# Patient Record
Sex: MALE | Race: WHITE | NOT HISPANIC OR LATINO | Employment: UNEMPLOYED | URBAN - METROPOLITAN AREA
[De-identification: names, ages, dates, MRNs, and addresses within clinical notes are randomized per-mention and may not be internally consistent; named-entity substitution may affect disease eponyms.]

---

## 2023-11-01 PROBLEM — E78.00 HYPERCHOLESTEROLEMIA: Status: ACTIVE | Noted: 2023-11-01

## 2023-11-01 PROBLEM — F32.A DEPRESSION: Status: ACTIVE | Noted: 2023-11-01

## 2023-11-01 PROBLEM — G89.29 CHRONIC PAIN: Status: ACTIVE | Noted: 2023-11-01

## 2023-11-01 PROBLEM — N52.9 ERECTILE DYSFUNCTION: Status: ACTIVE | Noted: 2023-11-01

## 2023-11-01 PROBLEM — R20.0 NUMBNESS: Status: ACTIVE | Noted: 2023-11-01

## 2023-11-01 PROBLEM — G56.00 CARPAL TUNNEL SYNDROME: Status: ACTIVE | Noted: 2023-11-01

## 2023-11-01 PROBLEM — E11.9 TYPE 2 DIABETES MELLITUS WITHOUT COMPLICATION (MULTI): Status: ACTIVE | Noted: 2023-11-01

## 2023-11-01 PROBLEM — E11.65 HYPERGLYCEMIA DUE TO TYPE 2 DIABETES MELLITUS (MULTI): Status: ACTIVE | Noted: 2023-11-01

## 2023-11-01 PROBLEM — J44.9 CHRONIC OBSTRUCTIVE PULMONARY DISEASE (MULTI): Status: ACTIVE | Noted: 2023-11-01

## 2023-11-01 PROBLEM — F41.9 ANXIETY: Status: ACTIVE | Noted: 2023-11-01

## 2023-11-01 PROBLEM — M50.90 CERVICAL DISC DISEASE: Status: ACTIVE | Noted: 2023-11-01

## 2023-11-01 PROBLEM — C44.91 BASAL CELL CARCINOMA: Status: ACTIVE | Noted: 2023-11-01

## 2023-11-01 RX ORDER — TIRZEPATIDE 5 MG/.5ML
5 INJECTION, SOLUTION SUBCUTANEOUS
COMMUNITY
Start: 2023-07-24 | End: 2023-11-02 | Stop reason: DRUGHIGH

## 2023-11-01 RX ORDER — TADALAFIL 20 MG/1
20 TABLET ORAL DAILY
COMMUNITY
Start: 2020-10-01 | End: 2023-11-02 | Stop reason: SDUPTHER

## 2023-11-01 RX ORDER — TIRZEPATIDE 2.5 MG/.5ML
2.5 INJECTION, SOLUTION SUBCUTANEOUS
COMMUNITY
Start: 2023-07-24 | End: 2023-11-02 | Stop reason: DRUGHIGH

## 2023-11-01 RX ORDER — OXYCODONE AND ACETAMINOPHEN 5; 325 MG/1; MG/1
1 TABLET ORAL EVERY 6 HOURS PRN
COMMUNITY
Start: 2023-05-11 | End: 2023-11-02 | Stop reason: SDUPTHER

## 2023-11-01 RX ORDER — ALPRAZOLAM 0.5 MG/1
0.5 TABLET ORAL 2 TIMES DAILY PRN
COMMUNITY
Start: 2023-05-11 | End: 2023-11-02 | Stop reason: SDUPTHER

## 2023-11-01 RX ORDER — PEN NEEDLE, DIABETIC 30 GX3/16"
NEEDLE, DISPOSABLE MISCELLANEOUS
COMMUNITY
Start: 2020-12-07

## 2023-11-01 RX ORDER — SERTRALINE HYDROCHLORIDE 50 MG/1
50 TABLET, FILM COATED ORAL DAILY
COMMUNITY
Start: 2023-05-11 | End: 2023-11-02 | Stop reason: DRUGHIGH

## 2023-11-01 RX ORDER — FLUTICASONE FUROATE AND VILANTEROL 200; 25 UG/1; UG/1
1 POWDER RESPIRATORY (INHALATION) DAILY
COMMUNITY
Start: 2021-03-05

## 2023-11-01 RX ORDER — DAPAGLIFLOZIN 10 MG/1
10 TABLET, FILM COATED ORAL DAILY
COMMUNITY
Start: 2023-01-24

## 2023-11-01 RX ORDER — OMEPRAZOLE 40 MG/1
40 CAPSULE, DELAYED RELEASE ORAL
COMMUNITY
Start: 2023-03-21

## 2023-11-01 RX ORDER — DULAGLUTIDE 1.5 MG/.5ML
1.5 INJECTION, SOLUTION SUBCUTANEOUS
COMMUNITY
Start: 2022-09-13

## 2023-11-02 ENCOUNTER — OFFICE VISIT (OUTPATIENT)
Dept: PRIMARY CARE | Facility: CLINIC | Age: 53
End: 2023-11-02
Payer: COMMERCIAL

## 2023-11-02 VITALS
WEIGHT: 166 LBS | HEIGHT: 70 IN | SYSTOLIC BLOOD PRESSURE: 122 MMHG | DIASTOLIC BLOOD PRESSURE: 74 MMHG | TEMPERATURE: 98.4 F | HEART RATE: 74 BPM | RESPIRATION RATE: 18 BRPM | BODY MASS INDEX: 23.77 KG/M2 | OXYGEN SATURATION: 97 %

## 2023-11-02 DIAGNOSIS — E11.9 TYPE 2 DIABETES MELLITUS WITHOUT COMPLICATION, UNSPECIFIED WHETHER LONG TERM INSULIN USE (MULTI): ICD-10-CM

## 2023-11-02 DIAGNOSIS — F41.9 ANXIETY: Primary | ICD-10-CM

## 2023-11-02 DIAGNOSIS — N52.9 ERECTILE DYSFUNCTION, UNSPECIFIED ERECTILE DYSFUNCTION TYPE: ICD-10-CM

## 2023-11-02 DIAGNOSIS — J44.9 CHRONIC OBSTRUCTIVE PULMONARY DISEASE, UNSPECIFIED COPD TYPE (MULTI): ICD-10-CM

## 2023-11-02 DIAGNOSIS — M50.90 CERVICAL DISC DISEASE: ICD-10-CM

## 2023-11-02 DIAGNOSIS — L98.9 SKIN LESION: ICD-10-CM

## 2023-11-02 LAB — POC HEMOGLOBIN A1C: 10.4 % (ref 4.2–6.5)

## 2023-11-02 PROCEDURE — 3078F DIAST BP <80 MM HG: CPT | Performed by: FAMILY MEDICINE

## 2023-11-02 PROCEDURE — 99214 OFFICE O/P EST MOD 30 MIN: CPT | Performed by: FAMILY MEDICINE

## 2023-11-02 PROCEDURE — 3074F SYST BP LT 130 MM HG: CPT | Performed by: FAMILY MEDICINE

## 2023-11-02 RX ORDER — OXYCODONE AND ACETAMINOPHEN 5; 325 MG/1; MG/1
1 TABLET ORAL EVERY 6 HOURS PRN
Qty: 30 TABLET | Refills: 0 | Status: SHIPPED | OUTPATIENT
Start: 2023-11-02 | End: 2024-01-09 | Stop reason: SDUPTHER

## 2023-11-02 RX ORDER — ALPRAZOLAM 0.5 MG/1
0.5 TABLET ORAL 2 TIMES DAILY PRN
Qty: 60 TABLET | Refills: 0 | Status: SHIPPED | OUTPATIENT
Start: 2023-11-02 | End: 2024-01-09 | Stop reason: ALTCHOICE

## 2023-11-02 RX ORDER — ALBUTEROL SULFATE 90 UG/1
2 AEROSOL, METERED RESPIRATORY (INHALATION) EVERY 4 HOURS PRN
Qty: 8 G | Refills: 5 | Status: SHIPPED | OUTPATIENT
Start: 2023-11-02 | End: 2024-01-18 | Stop reason: SDUPTHER

## 2023-11-02 RX ORDER — TADALAFIL 20 MG/1
20 TABLET ORAL DAILY
Qty: 10 TABLET | Refills: 11 | Status: SHIPPED | OUTPATIENT
Start: 2023-11-02

## 2023-11-02 RX ORDER — TIRZEPATIDE 7.5 MG/.5ML
7.5 INJECTION, SOLUTION SUBCUTANEOUS
Qty: 2 ML | Refills: 3 | Status: SHIPPED | OUTPATIENT
Start: 2023-11-02

## 2023-11-02 ASSESSMENT — PATIENT HEALTH QUESTIONNAIRE - PHQ9
2. FEELING DOWN, DEPRESSED OR HOPELESS: NOT AT ALL
SUM OF ALL RESPONSES TO PHQ9 QUESTIONS 1 AND 2: 0
1. LITTLE INTEREST OR PLEASURE IN DOING THINGS: NOT AT ALL

## 2023-11-02 ASSESSMENT — PAIN SCALES - GENERAL: PAINLEVEL: 4

## 2023-11-02 NOTE — PROGRESS NOTES
"Subjective   Patient ID: Clifton Beverly Jr. is a 53 y.o. male who presents for Med Refill (Pt here for medication check).    Med Refill         Review of Systems    Objective   /74 (BP Location: Left arm, Patient Position: Sitting, BP Cuff Size: Adult)   Pulse 74   Temp 36.9 °C (98.4 °F) (Temporal)   Resp 18   Ht 1.778 m (5' 10\")   Wt 75.3 kg (166 lb)   SpO2 97%   BMI 23.82 kg/m²     Physical Exam  Constitutional:       General: He is not in acute distress.     Appearance: Normal appearance.   Cardiovascular:      Rate and Rhythm: Normal rate and regular rhythm.      Heart sounds: No murmur heard.  Pulmonary:      Breath sounds: Normal breath sounds. No wheezing.   Neurological:      Mental Status: He is alert.         Assessment/Plan   Problem List Items Addressed This Visit             ICD-10-CM    Anxiety - Primary F41.9    Cervical disc disease M50.90    Chronic obstructive pulmonary disease (CMS/[facility]) J44.9    Erectile dysfunction N52.9    Type 2 diabetes mellitus without complication (CMS/[facility]) E11.9    Relevant Orders    POCT glycosylated hemoglobin (Hb A1C) manually resulted (Completed)          "

## 2024-01-09 ENCOUNTER — OFFICE VISIT (OUTPATIENT)
Dept: PRIMARY CARE | Facility: CLINIC | Age: 54
End: 2024-01-09
Payer: COMMERCIAL

## 2024-01-09 VITALS
DIASTOLIC BLOOD PRESSURE: 76 MMHG | HEART RATE: 77 BPM | OXYGEN SATURATION: 97 % | BODY MASS INDEX: 24.14 KG/M2 | TEMPERATURE: 98.6 F | RESPIRATION RATE: 18 BRPM | SYSTOLIC BLOOD PRESSURE: 128 MMHG | HEIGHT: 69 IN | WEIGHT: 163 LBS

## 2024-01-09 DIAGNOSIS — J44.9 CHRONIC OBSTRUCTIVE PULMONARY DISEASE, UNSPECIFIED COPD TYPE (MULTI): ICD-10-CM

## 2024-01-09 DIAGNOSIS — M50.90 CERVICAL DISC DISEASE: ICD-10-CM

## 2024-01-09 DIAGNOSIS — F41.9 ANXIETY: Primary | ICD-10-CM

## 2024-01-09 PROCEDURE — 3078F DIAST BP <80 MM HG: CPT | Performed by: FAMILY MEDICINE

## 2024-01-09 PROCEDURE — 99213 OFFICE O/P EST LOW 20 MIN: CPT | Performed by: FAMILY MEDICINE

## 2024-01-09 PROCEDURE — 3074F SYST BP LT 130 MM HG: CPT | Performed by: FAMILY MEDICINE

## 2024-01-09 RX ORDER — SERTRALINE HYDROCHLORIDE 100 MG/1
100 TABLET, FILM COATED ORAL DAILY
Qty: 30 TABLET | Refills: 1 | Status: SHIPPED | OUTPATIENT
Start: 2024-01-09 | End: 2024-03-26

## 2024-01-09 RX ORDER — OXYCODONE AND ACETAMINOPHEN 5; 325 MG/1; MG/1
1 TABLET ORAL EVERY 6 HOURS PRN
Qty: 30 TABLET | Refills: 0 | Status: SHIPPED | OUTPATIENT
Start: 2024-01-09

## 2024-01-09 RX ORDER — FLUTICASONE FUROATE, UMECLIDINIUM BROMIDE AND VILANTEROL TRIFENATATE 200; 62.5; 25 UG/1; UG/1; UG/1
1 POWDER RESPIRATORY (INHALATION) DAILY
Qty: 1 EACH | Refills: 3 | Status: SHIPPED | OUTPATIENT
Start: 2024-01-09 | End: 2024-02-08

## 2024-01-09 RX ORDER — SERTRALINE HYDROCHLORIDE 50 MG/1
50 TABLET, FILM COATED ORAL DAILY
COMMUNITY
Start: 2023-12-10

## 2024-01-09 RX ORDER — CLONAZEPAM 0.5 MG/1
0.5 TABLET ORAL NIGHTLY
Qty: 30 TABLET | Refills: 0 | Status: SHIPPED | OUTPATIENT
Start: 2024-01-09 | End: 2024-01-18 | Stop reason: SDUPTHER

## 2024-01-09 RX ORDER — BUDESONIDE AND FORMOTEROL FUMARATE DIHYDRATE 160; 4.5 UG/1; UG/1
2 AEROSOL RESPIRATORY (INHALATION)
COMMUNITY
End: 2024-01-29 | Stop reason: WASHOUT

## 2024-01-09 ASSESSMENT — PATIENT HEALTH QUESTIONNAIRE - PHQ9
SUM OF ALL RESPONSES TO PHQ9 QUESTIONS 1 AND 2: 0
2. FEELING DOWN, DEPRESSED OR HOPELESS: NOT AT ALL
1. LITTLE INTEREST OR PLEASURE IN DOING THINGS: NOT AT ALL

## 2024-01-09 ASSESSMENT — PAIN SCALES - GENERAL: PAINLEVEL: 4

## 2024-01-09 NOTE — PROGRESS NOTES
"Subjective   Patient ID: Clifton Beverly Jr. is a 53 y.o. male who presents for MEDICATION REFILLS (PT HERE FOR MEDICATION REFILLS).    HPI     Review of Systems    Objective   /76 (BP Location: Left arm, Patient Position: Sitting, BP Cuff Size: Adult)   Pulse 77   Temp 37 °C (98.6 °F) (Temporal)   Resp 18   Ht 1.753 m (5' 9\")   Wt 73.9 kg (163 lb)   SpO2 97%   BMI 24.07 kg/m²     Physical Exam  Constitutional:       General: He is not in acute distress.     Appearance: Normal appearance.   Cardiovascular:      Rate and Rhythm: Normal rate and regular rhythm.      Heart sounds: No murmur heard.  Pulmonary:      Breath sounds: Normal breath sounds. No wheezing.   Neurological:      Mental Status: He is alert.         Assessment/Plan   Problem List Items Addressed This Visit             ICD-10-CM    Anxiety - Primary F41.9    Cervical disc disease M50.90    Chronic obstructive pulmonary disease (CMS/HCC) J44.9          "

## 2024-01-12 DIAGNOSIS — F41.9 ANXIETY: ICD-10-CM

## 2024-01-12 DIAGNOSIS — M50.90 CERVICAL DISC DISEASE: ICD-10-CM

## 2024-01-12 DIAGNOSIS — J44.9 CHRONIC OBSTRUCTIVE PULMONARY DISEASE, UNSPECIFIED COPD TYPE (MULTI): ICD-10-CM

## 2024-01-12 RX ORDER — FLUTICASONE FUROATE, UMECLIDINIUM BROMIDE AND VILANTEROL TRIFENATATE 200; 62.5; 25 UG/1; UG/1; UG/1
1 POWDER RESPIRATORY (INHALATION) DAILY
Qty: 1 EACH | Refills: 3 | OUTPATIENT
Start: 2024-01-12 | End: 2024-02-11

## 2024-01-12 RX ORDER — OXYCODONE AND ACETAMINOPHEN 5; 325 MG/1; MG/1
1 TABLET ORAL EVERY 6 HOURS PRN
Qty: 30 TABLET | Refills: 0 | OUTPATIENT
Start: 2024-01-12

## 2024-01-12 RX ORDER — SERTRALINE HYDROCHLORIDE 100 MG/1
100 TABLET, FILM COATED ORAL DAILY
Qty: 30 TABLET | Refills: 1 | OUTPATIENT
Start: 2024-01-12 | End: 2024-03-12

## 2024-01-12 RX ORDER — CLONAZEPAM 0.5 MG/1
0.5 TABLET ORAL NIGHTLY
Qty: 30 TABLET | Refills: 0 | OUTPATIENT
Start: 2024-01-12 | End: 2024-02-11

## 2024-01-15 DIAGNOSIS — F41.9 ANXIETY: ICD-10-CM

## 2024-01-15 DIAGNOSIS — M50.90 CERVICAL DISC DISEASE: ICD-10-CM

## 2024-01-15 RX ORDER — OXYCODONE AND ACETAMINOPHEN 5; 325 MG/1; MG/1
1 TABLET ORAL EVERY 6 HOURS PRN
Qty: 30 TABLET | Refills: 0 | OUTPATIENT
Start: 2024-01-15

## 2024-01-15 RX ORDER — SERTRALINE HYDROCHLORIDE 50 MG/1
50 TABLET, FILM COATED ORAL DAILY
Qty: 30 TABLET | Refills: 11 | OUTPATIENT
Start: 2024-01-15 | End: 2025-01-14

## 2024-01-15 RX ORDER — CLONAZEPAM 0.5 MG/1
0.5 TABLET ORAL NIGHTLY
Qty: 30 TABLET | Refills: 0 | OUTPATIENT
Start: 2024-01-15 | End: 2024-02-14

## 2024-01-18 DIAGNOSIS — F41.9 ANXIETY: ICD-10-CM

## 2024-01-18 DIAGNOSIS — J44.9 CHRONIC OBSTRUCTIVE PULMONARY DISEASE, UNSPECIFIED COPD TYPE (MULTI): ICD-10-CM

## 2024-01-18 RX ORDER — ALBUTEROL SULFATE 90 UG/1
2 AEROSOL, METERED RESPIRATORY (INHALATION) EVERY 4 HOURS PRN
Qty: 18 G | Refills: 5 | Status: SHIPPED | OUTPATIENT
Start: 2024-01-18 | End: 2025-01-17

## 2024-01-18 RX ORDER — CLONAZEPAM 0.5 MG/1
0.5 TABLET ORAL NIGHTLY
Qty: 30 TABLET | Refills: 0 | Status: SHIPPED | OUTPATIENT
Start: 2024-01-18 | End: 2024-02-17

## 2024-01-22 DIAGNOSIS — M50.90 CERVICAL DISC DISEASE: ICD-10-CM

## 2024-01-22 RX ORDER — OXYCODONE AND ACETAMINOPHEN 5; 325 MG/1; MG/1
1 TABLET ORAL EVERY 6 HOURS PRN
Qty: 30 TABLET | Refills: 0 | OUTPATIENT
Start: 2024-01-22

## 2024-01-29 ENCOUNTER — OFFICE VISIT (OUTPATIENT)
Dept: PRIMARY CARE | Facility: CLINIC | Age: 54
End: 2024-01-29
Payer: COMMERCIAL

## 2024-01-29 VITALS
OXYGEN SATURATION: 96 % | WEIGHT: 163.8 LBS | SYSTOLIC BLOOD PRESSURE: 128 MMHG | DIASTOLIC BLOOD PRESSURE: 72 MMHG | HEIGHT: 70 IN | TEMPERATURE: 96.8 F | RESPIRATION RATE: 18 BRPM | BODY MASS INDEX: 23.45 KG/M2 | HEART RATE: 85 BPM

## 2024-01-29 DIAGNOSIS — F41.9 ANXIETY: ICD-10-CM

## 2024-01-29 DIAGNOSIS — M54.12 CERVICAL RADICULITIS: Primary | ICD-10-CM

## 2024-01-29 PROBLEM — C44.90 SKIN CANCER: Status: ACTIVE | Noted: 2018-01-24

## 2024-01-29 PROBLEM — H57.10 BLIND PAINFUL EYE: Status: ACTIVE | Noted: 2018-05-22

## 2024-01-29 PROBLEM — H54.40 BLIND PAINFUL EYE: Status: ACTIVE | Noted: 2018-05-22

## 2024-01-29 PROBLEM — M54.2 CERVICALGIA: Status: ACTIVE | Noted: 2020-10-14

## 2024-01-29 PROCEDURE — 3078F DIAST BP <80 MM HG: CPT | Performed by: FAMILY MEDICINE

## 2024-01-29 PROCEDURE — 99213 OFFICE O/P EST LOW 20 MIN: CPT | Performed by: FAMILY MEDICINE

## 2024-01-29 PROCEDURE — 3074F SYST BP LT 130 MM HG: CPT | Performed by: FAMILY MEDICINE

## 2024-01-29 RX ORDER — ALPRAZOLAM 0.5 MG/1
0.5 TABLET ORAL NIGHTLY PRN
Qty: 7 TABLET | Refills: 0 | Status: SHIPPED | OUTPATIENT
Start: 2024-01-29 | End: 2024-02-05

## 2024-01-29 RX ORDER — CYCLOBENZAPRINE HCL 10 MG
10 TABLET ORAL NIGHTLY PRN
Qty: 30 TABLET | Refills: 0 | Status: SHIPPED | OUTPATIENT
Start: 2024-01-29 | End: 2024-03-29

## 2024-01-29 RX ORDER — PREDNISONE 20 MG/1
TABLET ORAL
Qty: 20 TABLET | Refills: 0 | Status: SHIPPED | OUTPATIENT
Start: 2024-01-29 | End: 2024-02-10

## 2024-01-29 RX ORDER — OXYCODONE AND ACETAMINOPHEN 5; 325 MG/1; MG/1
1 TABLET ORAL EVERY 6 HOURS PRN
Qty: 5 TABLET | Refills: 0 | Status: SHIPPED | OUTPATIENT
Start: 2024-01-29 | End: 2024-02-05

## 2024-01-29 ASSESSMENT — PAIN SCALES - GENERAL: PAINLEVEL: 7

## 2024-01-29 ASSESSMENT — ENCOUNTER SYMPTOMS: NECK PAIN: 1

## 2024-01-29 NOTE — PROGRESS NOTES
"Subjective   Patient ID: Clifton Beverly Jr. is a 53 y.o. male who presents for Neck Pain.    HPI pt c/o ongoing neck pain    Review of Systems    Objective   /72   Pulse 85   Temp 36 °C (96.8 °F)   Resp 18   Ht 1.778 m (5' 10\")   Wt 74.3 kg (163 lb 12.8 oz)   SpO2 96%   BMI 23.50 kg/m²     Physical Exam    Assessment/Plan          "

## 2024-01-29 NOTE — PROGRESS NOTES
"Subjective   Patient ID: Clifton Beverly Jr. is a 53 y.o. male who presents for Neck Pain.    Neck Pain          Review of Systems   Musculoskeletal:  Positive for neck pain.       Objective   /72   Pulse 85   Temp 36 °C (96.8 °F)   Resp 18   Ht 1.778 m (5' 10\")   Wt 74.3 kg (163 lb 12.8 oz)   SpO2 96%   BMI 23.50 kg/m²     Physical Exam  Constitutional:       General: He is not in acute distress.     Appearance: Normal appearance.   Cardiovascular:      Rate and Rhythm: Normal rate and regular rhythm.      Heart sounds: No murmur heard.  Pulmonary:      Breath sounds: Normal breath sounds. No wheezing.   Neurological:      Mental Status: He is alert.     Neck: psm spasm and ttp bl    Assessment/Plan   Problem List Items Addressed This Visit             ICD-10-CM    Anxiety F41.9    Relevant Medications    ALPRAZolam (Xanax) 0.5 mg tablet    Cervical radiculitis - Primary M54.12    Relevant Medications    predniSONE (Deltasone) 20 mg tablet    cyclobenzaprine (Flexeril) 10 mg tablet    oxyCODONE-acetaminophen (Percocet) 5-325 mg tablet     Pt will lower xzoloft to 75 mg qd     "

## 2024-03-25 DIAGNOSIS — F41.9 ANXIETY: ICD-10-CM

## 2024-03-26 RX ORDER — SERTRALINE HYDROCHLORIDE 100 MG/1
100 TABLET, FILM COATED ORAL DAILY
Qty: 90 TABLET | Refills: 3 | Status: SHIPPED | OUTPATIENT
Start: 2024-03-26

## 2024-08-28 ENCOUNTER — APPOINTMENT (OUTPATIENT)
Dept: RADIOLOGY | Facility: HOSPITAL | Age: 54
End: 2024-08-28
Payer: COMMERCIAL

## 2024-08-28 ENCOUNTER — HOSPITAL ENCOUNTER (EMERGENCY)
Facility: HOSPITAL | Age: 54
Discharge: LEFT BEFORE DISCHARGE | End: 2024-08-28
Attending: EMERGENCY MEDICINE
Payer: COMMERCIAL

## 2024-08-28 VITALS
HEART RATE: 72 BPM | TEMPERATURE: 97.7 F | SYSTOLIC BLOOD PRESSURE: 136 MMHG | HEIGHT: 69 IN | OXYGEN SATURATION: 96 % | RESPIRATION RATE: 22 BRPM | WEIGHT: 150 LBS | DIASTOLIC BLOOD PRESSURE: 89 MMHG | BODY MASS INDEX: 22.22 KG/M2

## 2024-08-28 DIAGNOSIS — R11.2 NAUSEA AND VOMITING, UNSPECIFIED VOMITING TYPE: ICD-10-CM

## 2024-08-28 DIAGNOSIS — R10.13 EPIGASTRIC PAIN: Primary | ICD-10-CM

## 2024-08-28 LAB
ALBUMIN SERPL-MCNC: 3.9 G/DL (ref 3.5–5)
ALP BLD-CCNC: 79 U/L (ref 35–125)
ALT SERPL-CCNC: 14 U/L (ref 5–40)
AMPHETAMINES UR QL SCN>1000 NG/ML: NEGATIVE
ANION GAP BLDV CALCULATED.4IONS-SCNC: 11 MMOL/L (ref 10–25)
ANION GAP SERPL CALC-SCNC: 10 MMOL/L
APAP SERPL-MCNC: <5 UG/ML
APPEARANCE UR: CLEAR
AST SERPL-CCNC: 13 U/L (ref 5–40)
B-OH-BUTYR+ACETOACET BLD-SCNC: 0.2 MMOL/L (ref 0.1–0.3)
BARBITURATES UR QL SCN>300 NG/ML: NEGATIVE
BASE EXCESS BLDV CALC-SCNC: 3.3 MMOL/L (ref -2–3)
BASOPHILS # BLD AUTO: 0.07 X10*3/UL (ref 0–0.1)
BASOPHILS NFR BLD AUTO: 1 %
BENZODIAZ UR QL SCN>300 NG/ML: NEGATIVE
BILIRUB SERPL-MCNC: 0.3 MG/DL (ref 0.1–1.2)
BILIRUB UR STRIP.AUTO-MCNC: NEGATIVE MG/DL
BODY TEMPERATURE: 37 DEGREES CELSIUS
BUN SERPL-MCNC: 7 MG/DL (ref 8–25)
BZE UR QL SCN>300 NG/ML: NEGATIVE
CA-I BLDV-SCNC: 1.14 MMOL/L (ref 1.1–1.33)
CALCIUM SERPL-MCNC: 8.7 MG/DL (ref 8.5–10.4)
CANNABINOIDS UR QL SCN>50 NG/ML: POSITIVE
CHLORIDE BLDV-SCNC: 96 MMOL/L (ref 98–107)
CHLORIDE SERPL-SCNC: 98 MMOL/L (ref 97–107)
CO2 SERPL-SCNC: 27 MMOL/L (ref 24–31)
COLOR UR: ABNORMAL
CREAT SERPL-MCNC: 0.6 MG/DL (ref 0.4–1.6)
EGFRCR SERPLBLD CKD-EPI 2021: >90 ML/MIN/1.73M*2
EOSINOPHIL # BLD AUTO: 0.11 X10*3/UL (ref 0–0.7)
EOSINOPHIL NFR BLD AUTO: 1.5 %
ERYTHROCYTE [DISTWIDTH] IN BLOOD BY AUTOMATED COUNT: 12.6 % (ref 11.5–14.5)
ETHANOL SERPL-MCNC: <0.01 G/DL
FENTANYL+NORFENTANYL UR QL SCN: NEGATIVE
GLUCOSE BLDV-MCNC: 321 MG/DL (ref 74–99)
GLUCOSE SERPL-MCNC: 299 MG/DL (ref 65–99)
GLUCOSE UR STRIP.AUTO-MCNC: ABNORMAL MG/DL
HCO3 BLDV-SCNC: 26.9 MMOL/L (ref 22–26)
HCT VFR BLD AUTO: 44.3 % (ref 41–52)
HCT VFR BLD EST: 50 % (ref 41–52)
HGB BLD-MCNC: 15.5 G/DL (ref 13.5–17.5)
HGB BLDV-MCNC: 16.5 G/DL (ref 13.5–17.5)
IMM GRANULOCYTES # BLD AUTO: 0.01 X10*3/UL (ref 0–0.7)
IMM GRANULOCYTES NFR BLD AUTO: 0.1 % (ref 0–0.9)
INHALED O2 CONCENTRATION: 21 %
KETONES UR STRIP.AUTO-MCNC: NEGATIVE MG/DL
LACTATE BLDV-SCNC: 1.7 MMOL/L (ref 0.4–2)
LEUKOCYTE ESTERASE UR QL STRIP.AUTO: NEGATIVE
LIPASE SERPL-CCNC: 29 U/L (ref 16–63)
LYMPHOCYTES # BLD AUTO: 1.74 X10*3/UL (ref 1.2–4.8)
LYMPHOCYTES NFR BLD AUTO: 24.4 %
MAGNESIUM SERPL-MCNC: 1.9 MG/DL (ref 1.6–3.1)
MCH RBC QN AUTO: 30.6 PG (ref 26–34)
MCHC RBC AUTO-ENTMCNC: 35 G/DL (ref 32–36)
MCV RBC AUTO: 87 FL (ref 80–100)
METHADONE UR QL SCN>300 NG/ML: NEGATIVE
MONOCYTES # BLD AUTO: 0.42 X10*3/UL (ref 0.1–1)
MONOCYTES NFR BLD AUTO: 5.9 %
NEUTROPHILS # BLD AUTO: 4.79 X10*3/UL (ref 1.2–7.7)
NEUTROPHILS NFR BLD AUTO: 67.1 %
NITRITE UR QL STRIP.AUTO: NEGATIVE
NRBC BLD-RTO: 0 /100 WBCS (ref 0–0)
OPIATES UR QL SCN>300 NG/ML: NEGATIVE
OXYCODONE UR QL: NEGATIVE
OXYHGB MFR BLDV: 60.4 % (ref 45–75)
PCO2 BLDV: 37 MM HG (ref 41–51)
PCP UR QL SCN>25 NG/ML: NEGATIVE
PH BLDV: 7.47 PH (ref 7.33–7.43)
PH UR STRIP.AUTO: 7.5 [PH]
PHOSPHATE SERPL-MCNC: 3 MG/DL (ref 2.5–4.5)
PLATELET # BLD AUTO: 351 X10*3/UL (ref 150–450)
PO2 BLDV: 33 MM HG (ref 35–45)
POTASSIUM BLDV-SCNC: 3.9 MMOL/L (ref 3.5–5.3)
POTASSIUM SERPL-SCNC: 4 MMOL/L (ref 3.4–5.1)
PROT SERPL-MCNC: 6.2 G/DL (ref 5.9–7.9)
PROT UR STRIP.AUTO-MCNC: NEGATIVE MG/DL
RBC # BLD AUTO: 5.07 X10*6/UL (ref 4.5–5.9)
RBC # UR STRIP.AUTO: NEGATIVE /UL
SALICYLATES SERPL-MCNC: 2 MG/DL
SAO2 % BLDV: 67 % (ref 45–75)
SODIUM BLDV-SCNC: 130 MMOL/L (ref 136–145)
SODIUM SERPL-SCNC: 135 MMOL/L (ref 133–145)
SP GR UR STRIP.AUTO: 1.02
TROPONIN T SERPL-MCNC: 9 NG/L
UROBILINOGEN UR STRIP.AUTO-MCNC: NORMAL MG/DL
WBC # BLD AUTO: 7.1 X10*3/UL (ref 4.4–11.3)

## 2024-08-28 PROCEDURE — 74177 CT ABD & PELVIS W/CONTRAST: CPT

## 2024-08-28 PROCEDURE — 84100 ASSAY OF PHOSPHORUS: CPT | Performed by: PHYSICIAN ASSISTANT

## 2024-08-28 PROCEDURE — 82010 KETONE BODYS QUAN: CPT | Performed by: PHYSICIAN ASSISTANT

## 2024-08-28 PROCEDURE — 85025 COMPLETE CBC W/AUTO DIFF WBC: CPT | Performed by: PHYSICIAN ASSISTANT

## 2024-08-28 PROCEDURE — 96375 TX/PRO/DX INJ NEW DRUG ADDON: CPT

## 2024-08-28 PROCEDURE — 83690 ASSAY OF LIPASE: CPT | Performed by: PHYSICIAN ASSISTANT

## 2024-08-28 PROCEDURE — 74177 CT ABD & PELVIS W/CONTRAST: CPT | Performed by: RADIOLOGY

## 2024-08-28 PROCEDURE — 36415 COLL VENOUS BLD VENIPUNCTURE: CPT | Performed by: EMERGENCY MEDICINE

## 2024-08-28 PROCEDURE — 71045 X-RAY EXAM CHEST 1 VIEW: CPT | Performed by: RADIOLOGY

## 2024-08-28 PROCEDURE — 2550000001 HC RX 255 CONTRASTS: Performed by: PHYSICIAN ASSISTANT

## 2024-08-28 PROCEDURE — 80053 COMPREHEN METABOLIC PANEL: CPT | Performed by: PHYSICIAN ASSISTANT

## 2024-08-28 PROCEDURE — 84075 ASSAY ALKALINE PHOSPHATASE: CPT | Performed by: PHYSICIAN ASSISTANT

## 2024-08-28 PROCEDURE — 71045 X-RAY EXAM CHEST 1 VIEW: CPT

## 2024-08-28 PROCEDURE — 80307 DRUG TEST PRSMV CHEM ANLYZR: CPT | Performed by: EMERGENCY MEDICINE

## 2024-08-28 PROCEDURE — 83735 ASSAY OF MAGNESIUM: CPT | Performed by: PHYSICIAN ASSISTANT

## 2024-08-28 PROCEDURE — 96374 THER/PROPH/DIAG INJ IV PUSH: CPT | Mod: 59

## 2024-08-28 PROCEDURE — 80143 DRUG ASSAY ACETAMINOPHEN: CPT | Performed by: EMERGENCY MEDICINE

## 2024-08-28 PROCEDURE — 2500000004 HC RX 250 GENERAL PHARMACY W/ HCPCS (ALT 636 FOR OP/ED): Performed by: PHYSICIAN ASSISTANT

## 2024-08-28 PROCEDURE — 36415 COLL VENOUS BLD VENIPUNCTURE: CPT | Performed by: PHYSICIAN ASSISTANT

## 2024-08-28 PROCEDURE — 99284 EMERGENCY DEPT VISIT MOD MDM: CPT

## 2024-08-28 PROCEDURE — 84484 ASSAY OF TROPONIN QUANT: CPT | Performed by: EMERGENCY MEDICINE

## 2024-08-28 PROCEDURE — 81003 URINALYSIS AUTO W/O SCOPE: CPT | Performed by: PHYSICIAN ASSISTANT

## 2024-08-28 PROCEDURE — 82947 ASSAY GLUCOSE BLOOD QUANT: CPT

## 2024-08-28 PROCEDURE — 96361 HYDRATE IV INFUSION ADD-ON: CPT

## 2024-08-28 RX ORDER — METOCLOPRAMIDE HYDROCHLORIDE 5 MG/ML
5 INJECTION INTRAMUSCULAR; INTRAVENOUS ONCE
Status: COMPLETED | OUTPATIENT
Start: 2024-08-28 | End: 2024-08-28

## 2024-08-28 RX ORDER — ONDANSETRON HYDROCHLORIDE 2 MG/ML
4 INJECTION, SOLUTION INTRAVENOUS ONCE
Status: COMPLETED | OUTPATIENT
Start: 2024-08-28 | End: 2024-08-28

## 2024-08-28 RX ORDER — FAMOTIDINE 10 MG/ML
20 INJECTION INTRAVENOUS ONCE
Status: COMPLETED | OUTPATIENT
Start: 2024-08-28 | End: 2024-08-28

## 2024-08-28 ASSESSMENT — PAIN SCALES - GENERAL: PAINLEVEL_OUTOF10: 0 - NO PAIN

## 2024-08-28 ASSESSMENT — COLUMBIA-SUICIDE SEVERITY RATING SCALE - C-SSRS
1. IN THE PAST MONTH, HAVE YOU WISHED YOU WERE DEAD OR WISHED YOU COULD GO TO SLEEP AND NOT WAKE UP?: NO
6. HAVE YOU EVER DONE ANYTHING, STARTED TO DO ANYTHING, OR PREPARED TO DO ANYTHING TO END YOUR LIFE?: NO
2. HAVE YOU ACTUALLY HAD ANY THOUGHTS OF KILLING YOURSELF?: NO

## 2024-08-28 ASSESSMENT — PAIN - FUNCTIONAL ASSESSMENT: PAIN_FUNCTIONAL_ASSESSMENT: 0-10

## 2024-08-28 NOTE — PROGRESS NOTES
Attestation/Supervisory note for MARCELL Alcantara      The patient is a 53-year-old male presenting to the emergency department for evaluation of nausea, vomiting, abdominal discomfort, malaise and fatigue.  States that he has been having worsening symptoms over the past 4 to 5 days.  The patient states that he is concerned that he may have DKA.  He states he is an insulin-dependent diabetic but he stopped taking his insulin about 1 year ago without the consent of his primary care physician, Dr. Miller.  He states that he was trying to watch his weight and watch what he ate.  He also had some insurance issues that kept him from following up regularly.  He denies any headache or visual changes.  He denies any focal weakness or numbness.  No fever or chills.  No chest pain or shortness of breath.  He states the abdominal pain is an intermittent diffuse discomfort.  No specific better or worse.  No radiation.  He denies any cough or congestion.  He denies any urinary complaints.  He denies any diarrhea or constipation.  No sick contacts or recent travel.  All pertinent positives and negatives are recorded above.  All other systems reviewed and otherwise negative.  Vital signs with diastolic hypertension and mild tachypnea but otherwise within normal limits.  Physical exam with a well-nourished well-developed male with disheveled appearance but no evidence of acute distress.  HEENT exam within normal limits.  He has no evidence of airway compromise or respiratory distress.  Abdominal exam is benign.  He does not have any gross motor, neurologic or vascular deficits on exam.  Pulses are equal bilaterally.      EKG ordered but declined by the patient      IV Pepcid, IV Reglan, IV Zofran and IV fluids ordered with some improvement in his symptoms.      Diagnostic labs with evidence of substance abuse, hyperglycemia and glycosuria but no evidence of ketonuria or anion gap.      Initial troponin T 9. Repeat trop T ordered  but declined by the patient      CT abdomen pelvis w IV contrast   Final Result   Normal appendix. No bowel obstruction. No diverticulitis.        Left renal nonobstructing 3 mm calculi. No hydronephrosis bilaterally.        Enlarged prostate containing small dystrophic calcifications.        Scattered small dystrophic calcifications of the pancreas which may   be related to chronic pancreatitis.        MACRO:   None.        Signed by: Alexis Viera 8/28/2024 4:20 PM   Dictation workstation:   [id number]      XR chest 1 view   Final Result   1.  No evidence of acute cardiopulmonary process. Stable pulmonary   nodule left peripheral lung field.                  MACRO:   None        Signed by: Matty Arciniega 8/28/2024 3:35 PM   Dictation workstation:   [id number]           The patient does not have any evidence of ischemia on initial set of cardiac enzymes.  No events on telemetry but the patient did decline have an EKG performed.  He also declined to stay to have a repeat troponin T.  Chest x-ray without acute process.  No evidence of pneumonia or pneumothorax.  No evidence of CHF.  No widening of the mediastinum.  There was reportedly a stable left peripheral lung nodule per the radiology reading.  He does not have any evidence of hemodynamic instability.  He has a nonsurgical abdomen by exam.  CT abdomen pelvis showed no evidence of acute process.  He does have findings of chronic pancreatitis.  He does not have any evidence of acute pancreatitis, diverticulitis, bowel obstruction, or appendicitis.      There was no evidence of DKA and diagnostic labs.  No evidence of hemodynamic instability.  He did not have any evidence of airway compromise or respiratory distress.  His evaluation was limited by his lack of willingness to allow the EKG and repeat set of cardiac enzymes.  He is alert and coherent.  He does have capacity make informed decision.  He verbalized understand of the risk of leaving AGAINST  MEDICAL ADVICE including undiagnosed condition, worsening symptoms, permanent neurologic deficits and even possible death.  He was released AGAINST MEDICAL ADVICE.  He will follow-up with his primary care physician within 1 to 2 days for further management of his current symptoms.  He will return to the emergency department immediately with worsening of symptoms or onset of new symptoms.      Impression/diagnosis:  Nausea and vomiting  Malaise and fatigue  Diastolic hypertension  Abdominal pain, diffuse      I personally saw the patient and made/approve the management plan and take responsibility for the patient management.      I independently interpreted the following study (S) diagnostic labs      I personally discussed the patient's management with the patient      I reviewed the results of the diagnostic labs and diagnostic imaging.  Formal radiology read was completed by the radiologist.      Sally Kline MD

## 2024-08-28 NOTE — ED NOTES
Unable to complete plan of care. Per patient, he has an autistic son and needs to pick him up. Said he will come back if needed and said that his girlfriend can help him set up my chart to figure out test results.      Robert Andrade RN  08/28/24 9644

## 2024-08-28 NOTE — ED PROVIDER NOTES
HPI   Chief Complaint   Patient presents with    Hyperglycemia     Pt to ED for hyperglycemia and took  trulicity at home with triage BG of 293. Pt with intense nausea and vomiting since Saturday. Pt with headache, SOB, confusion.        This is a 53-year-old male presenting to the emergency department with complaints of nausea, vomiting and abdominal discomfort.  Symptoms are this past Saturday.  Patient states he has had intermittent episodes of nausea and vomiting since Saturday.  He has not been able to eat anything today.  Pain and nausea vomiting has been worse today.  Patient denies chest pain, coughing, shortness of breath, fever, chills.  He does have a history of type 2 diabetes, he does not take any medications for the diabetes.  He used to take metformin however he stopped this about a year ago because he did not like the way it made him feel.        Please see HPI for pertinent positive and negative ROS.     Patient History   Past Medical History:   Diagnosis Date    Diabetes mellitus (Multi)      No past surgical history on file.  Family History   Problem Relation Name Age of Onset    Cancer Mother      Diabetes Father      Cancer Father       Social History     Tobacco Use    Smoking status: Every Day     Current packs/day: 1.00     Types: Cigarettes    Smokeless tobacco: Never   Vaping Use    Vaping status: Never Used   Substance Use Topics    Alcohol use: Never    Drug use: Yes     Types: Marijuana       Physical Exam   ED Triage Vitals [24 1434]   Temperature Heart Rate Respirations BP   36.5 °C (97.7 °F) 72 (!) 22 136/89      Pulse Ox Temp Source Heart Rate Source Patient Position   96 % Temporal -- Sitting      BP Location FiO2 (%)     Left arm --       Physical Exam  GENERAL APPEARANCE: Awake and alert. No acute respiratory distress.   VITAL SIGNS: As per the nurses' triage record.  HEENT: Normocephalic, atraumatic.   NECK: Soft, nontender and supple  CHEST: Nontender to palpation.   Diffuse inspiratory wheeze bilaterally.  Symmetric rise and fall of chest wall.   HEART: Clear S1 and S2. Regular rate and rhythm.  Strong and equal pulses in the extremities.  ABDOMEN: Soft, tenderness to palpation in the epigastric region, nondistended  MUSCULOSKELETAL: The calves are nontender to palpation. Full gross active range of motion. Ambulating on own with no acute difficulties  NEUROLOGICAL: Awake, alert and oriented x 3. Motor power intact in the upper and lower extremities. Sensation is intact to light touch in the upper and lower extremities. Patient answering questions appropriately.   IMMUNOLOGICAL: No lymphatic streaking noted  DERMATOLOGIC: Warm and dry without petechiae, rashes, or ecchymosis noted on visible skin.   PYSCH: Cooperative with appropriate mood and affect.    ED Course & MDM   Diagnoses as of 08/28/24 1811   Epigastric pain   Nausea and vomiting, unspecified vomiting type                 No data recorded     Julia Coma Scale Score: 15 (08/28/24 1435 : Sherrie Sanders RN)                           Medical Decision Making  Parts of this chart have been completed using voice recognition software. Please excuse any errors of transcription.  My thought process and reason for plan has been formulated from the time that I saw the patient until the time of disposition and is not specific to one specific moment during their visit and furthermore my MDM encompasses this entire chart and not only this text box.      HPI: Detailed above.    Exam: A medically appropriate exam performed, outlined above, given the known history and presentation.    History obtained from: Patient    Medications given during visit:  Medications   sodium chloride 0.9 % bolus 1,414 mL (1,414 mL intravenous New Bag 8/28/24 1502)   ondansetron (Zofran) injection 4 mg (4 mg intravenous Given 8/28/24 1503)   famotidine PF (Pepcid) injection 20 mg (20 mg intravenous Given 8/28/24 1503)   metoclopramide (Reglan) injection  5 mg (5 mg intravenous Given 8/28/24 1532)   iohexol (OMNIPaque) 350 mg iodine/mL solution 75 mL (75 mL intravenous Given 8/28/24 1545)        Diagnostic/tests  Labs Reviewed   COMPREHENSIVE METABOLIC PANEL - Abnormal       Result Value    Glucose 299 (*)     Sodium 135      Potassium 4.0      Chloride 98      Bicarbonate 27      Urea Nitrogen 7 (*)     Creatinine 0.60      eGFR >90      Calcium 8.7      Albumin 3.9      Alkaline Phosphatase 79      Total Protein 6.2      AST 13      Bilirubin, Total 0.3      ALT 14      Anion Gap 10     BLOOD GAS VENOUS FULL PANEL - Abnormal    POCT pH, Venous 7.47 (*)     POCT pCO2, Venous 37 (*)     POCT pO2, Venous 33 (*)     POCT SO2, Venous 67      POCT Oxy Hemoglobin, Venous 60.4      POCT Hematocrit Calculated, Venous 50.0      POCT Sodium, Venous 130 (*)     POCT Potassium, Venous 3.9      POCT Chloride, Venous 96 (*)     POCT Ionized Calicum, Venous 1.14      POCT Glucose, Venous 321 (*)     POCT Lactate, Venous 1.7      POCT Base Excess, Venous 3.3 (*)     POCT HCO3 Calculated, Venous 26.9 (*)     POCT Hemoglobin, Venous 16.5      POCT Anion Gap, Venous 11.0      Patient Temperature 37.0      FiO2 21     URINALYSIS WITH REFLEX CULTURE AND MICROSCOPIC - Abnormal    Color, Urine Light-Yellow      Appearance, Urine Clear      Specific Gravity, Urine 1.025      pH, Urine 7.5      Protein, Urine NEGATIVE      Glucose, Urine OVER (4+) (*)     Blood, Urine NEGATIVE      Ketones, Urine NEGATIVE      Bilirubin, Urine NEGATIVE      Urobilinogen, Urine Normal      Nitrite, Urine NEGATIVE      Leukocyte Esterase, Urine NEGATIVE      Narrative:     OVER is reported when the result is greater than the clinically reportable range.   DRUG SCREEN,URINE - Abnormal    Amphetamine Screen, Urine Negative      Barbiturate Screen, Urine Negative      Benzodiazepines Screen, Urine Negative      Cannabinoid Screen, Urine Positive (*)     Cocaine Metabolite Screen, Urine Negative      Fentanyl  Screen, Urine Negative      Methadone Screen, Urine Negative      Opiate Screen, Urine Negative      Oxycodone Screen, Urine Negative      PCP Screen, Urine Negative      Narrative:     These toxicological screening tests provide unconfirmed qualitative measurements to aid in treatment and diagnosis in cases of drug use or overdose. This test is used only for medical purposes. A positive result does not indicate or measure intoxication. For specific test performance or pathologist consultation, please contact the Laboratory.    The following threshold concentrations are used for these analyses.Values at or above the threshold concentration are reported as positive. Values below the threshold are reported as negative.    Drug /Screening Threshold                                                                                                 THC/CANNABINOIDS................50 ng/ml  METHADONE......................300 ng/ml  COCAINE METABOLITES............300 ng/ml  BENZODIAZEPINE.................300 ng/ml  PCP.............................25 ng/ml  OPIATE.........................300 ng/ml  AMPHETAMINE/ECSTASY...........1000 ng/ml  BARBITURATE....................200 ng/ml  OXYCODONE......................100 ng/ml  FENTANYL.........................5 ng/ml       MAGNESIUM - Normal    Magnesium 1.90     PHOSPHORUS - Normal    Phosphorus 3.0     BETA HYDROXYBUTYRATE - Normal    Beta-Hydroxybutyrate 0.20      Narrative:     Values exceeding 0.27 mmol/L indicate ketosis.   LIPASE - Normal    Lipase 29     TROPONIN T, HIGH SENSITIVITY - Normal    Troponin T, High Sensitivity 9     ACUTE TOXICOLOGY PANEL, BLOOD - Normal    Acetaminophen <5.0      Salicylate  2      Alcohol <0.010     CBC WITH AUTO DIFFERENTIAL    WBC 7.1      nRBC 0.0      RBC 5.07      Hemoglobin 15.5      Hematocrit 44.3      MCV 87      MCH 30.6      MCHC 35.0      RDW 12.6      Platelets 351      Neutrophils % 67.1      Immature Granulocytes %, Automated  0.1      Lymphocytes % 24.4      Monocytes % 5.9      Eosinophils % 1.5      Basophils % 1.0      Neutrophils Absolute 4.79      Immature Granulocytes Absolute, Automated 0.01      Lymphocytes Absolute 1.74      Monocytes Absolute 0.42      Eosinophils Absolute 0.11      Basophils Absolute 0.07     URINALYSIS WITH REFLEX CULTURE AND MICROSCOPIC    Narrative:     The following orders were created for panel order Urinalysis with Reflex Culture and Microscopic.  Procedure                               Abnormality         Status                     ---------                               -----------         ------                     Urinalysis with Reflex C...[[id number]]  Abnormal            Final result               Extra Urine Gray Tube[[id number]]                            In process                   Please view results for these tests on the individual orders.   EXTRA URINE GRAY TUBE   TROPONIN T, HIGH SENSITIVITY   POCT GLUCOSE METER      CT abdomen pelvis w IV contrast   Final Result   Normal appendix. No bowel obstruction. No diverticulitis.        Left renal nonobstructing 3 mm calculi. No hydronephrosis bilaterally.        Enlarged prostate containing small dystrophic calcifications.        Scattered small dystrophic calcifications of the pancreas which may   be related to chronic pancreatitis.        MACRO:   None.        Signed by: Alexis Viera 8/28/2024 4:20 PM   Dictation workstation:   [id number]      XR chest 1 view   Final Result   1.  No evidence of acute cardiopulmonary process. Stable pulmonary   nodule left peripheral lung field.                  MACRO:   None        Signed by: Matty Arciniega 8/28/2024 3:35 PM   Dictation workstation:   [id number]           Considerations/further MDM:  Patient was seen in conjucntion with my supervising physician,  Dr. Kline. Please refer to her note.    Differential diagnosis includes but is not limited to pancreatitis versus cholecystitis versus acute  gastritis versus viral syndrome versus HHS versus DKA versus ACS including myocardial infarction.    Patient does not have an anion gap.  No acidosis.  Glucose is 299.  CBC shows no leukocytosis.  Urinalysis shows 4+ glucose but no ketones or evidence of UTI.  Lipase 29.  Serum ketones within normal limits.  Negative acute toxicology panel.  Initial troponin T was 9.  Chest x-ray shows no acute cardiopulmonary process.  CT scan abdomen pelvis with IV contrast shows normal appendix, no bowel obstruction, no diverticulitis.  Scattered small dystrophic calcifications of the pancreas which may be related to chronic pancreatitis.  Patient was a previous daily drinker however he has not alcohol in many years.  He was treated with IV normal saline 20 mL/kg IV fluid bolus upon arrival due to concern for HHS or DKA, also treated with IV Pepcid, IV Zofran and IV Reglan with improvement in his nausea and vomiting.  Patient was pending repeat troponin T and EKG.  He stated he needed to leave to go  his son from school.  He did not want to stay for completion of his workup.  Therefore, he did leave AGAINST MEDICAL ADVICE.  The patient was discharged by nursing staff prior to educating patient about reason for importance of EKG and repeat troponin T, risk of AMA discharge, and further plan based on imaging and labs.    Procedure  Procedures     Natalia Alcantara PA-C  08/28/24 0072

## 2024-08-28 NOTE — ED TRIAGE NOTES
Pt to ED for hyperglycemia and took  trulicity at home with triage BG of 293. Pt with intense nausea and vomiting since Saturday. Pt with headache, SOB, confusion.

## 2024-08-29 ENCOUNTER — OFFICE VISIT (OUTPATIENT)
Dept: PRIMARY CARE | Facility: CLINIC | Age: 54
End: 2024-08-29
Payer: COMMERCIAL

## 2024-08-29 ENCOUNTER — CLINICAL SUPPORT (OUTPATIENT)
Dept: PRIMARY CARE | Facility: CLINIC | Age: 54
End: 2024-08-29
Payer: COMMERCIAL

## 2024-08-29 VITALS
BODY MASS INDEX: 22.22 KG/M2 | DIASTOLIC BLOOD PRESSURE: 72 MMHG | OXYGEN SATURATION: 98 % | TEMPERATURE: 97.9 F | SYSTOLIC BLOOD PRESSURE: 120 MMHG | HEART RATE: 89 BPM | WEIGHT: 150 LBS | HEIGHT: 69 IN

## 2024-08-29 DIAGNOSIS — E11.9 TYPE 2 DIABETES MELLITUS WITHOUT COMPLICATION, WITHOUT LONG-TERM CURRENT USE OF INSULIN (MULTI): Primary | ICD-10-CM

## 2024-08-29 DIAGNOSIS — R11.2 NAUSEA AND VOMITING, UNSPECIFIED VOMITING TYPE: ICD-10-CM

## 2024-08-29 DIAGNOSIS — K86.1 CHRONIC PANCREATITIS, UNSPECIFIED PANCREATITIS TYPE (MULTI): ICD-10-CM

## 2024-08-29 DIAGNOSIS — J44.9 CHRONIC OBSTRUCTIVE PULMONARY DISEASE, UNSPECIFIED COPD TYPE (MULTI): ICD-10-CM

## 2024-08-29 DIAGNOSIS — E11.9 TYPE 2 DIABETES MELLITUS WITHOUT COMPLICATION, WITHOUT LONG-TERM CURRENT USE OF INSULIN (MULTI): ICD-10-CM

## 2024-08-29 LAB
GLUCOSE BLD MANUAL STRIP-MCNC: 293 MG/DL (ref 74–99)
HOLD SPECIMEN: NORMAL
POC HEMOGLOBIN A1C: 11.7 % (ref 4.2–6.5)

## 2024-08-29 PROCEDURE — 3074F SYST BP LT 130 MM HG: CPT | Performed by: REGISTERED NURSE

## 2024-08-29 PROCEDURE — 4004F PT TOBACCO SCREEN RCVD TLK: CPT | Performed by: REGISTERED NURSE

## 2024-08-29 PROCEDURE — 83036 HEMOGLOBIN GLYCOSYLATED A1C: CPT | Mod: QW | Performed by: REGISTERED NURSE

## 2024-08-29 PROCEDURE — 3008F BODY MASS INDEX DOCD: CPT | Performed by: REGISTERED NURSE

## 2024-08-29 PROCEDURE — 99214 OFFICE O/P EST MOD 30 MIN: CPT | Performed by: REGISTERED NURSE

## 2024-08-29 PROCEDURE — 3078F DIAST BP <80 MM HG: CPT | Performed by: REGISTERED NURSE

## 2024-08-29 RX ORDER — METFORMIN HYDROCHLORIDE 500 MG/1
500 TABLET, EXTENDED RELEASE ORAL
Qty: 90 TABLET | Refills: 3 | Status: SHIPPED | OUTPATIENT
Start: 2024-08-29 | End: 2025-08-29

## 2024-08-29 RX ORDER — ONDANSETRON 8 MG/1
8 TABLET, ORALLY DISINTEGRATING ORAL EVERY 8 HOURS PRN
Qty: 30 TABLET | Refills: 0 | Status: SHIPPED | OUTPATIENT
Start: 2024-08-29 | End: 2024-09-08

## 2024-08-29 RX ORDER — BLOOD-GLUCOSE SENSOR
EACH MISCELLANEOUS
Qty: 2 EACH | Refills: 11 | Status: SHIPPED | OUTPATIENT
Start: 2024-08-29

## 2024-08-29 ASSESSMENT — ENCOUNTER SYMPTOMS
DEPRESSION: 0
NAUSEA: 1
OCCASIONAL FEELINGS OF UNSTEADINESS: 0
LOSS OF SENSATION IN FEET: 0

## 2024-08-29 ASSESSMENT — PAIN SCALES - GENERAL: PAINLEVEL: 0-NO PAIN

## 2024-08-29 NOTE — PROGRESS NOTES
"Subjective   Patient ID: Clifton Beverly Jr. is a 53 y.o. male who presents for Vomiting (Pt states he has been vomiting for the past year, went to the [facility] ER yesterday, was diagnosed pancreatitis./Pt feels nauseous, headache, confusion- can't put thoughts together, can't follow through with tasks. Anxiety medication making feel like don't care. ).    Pt here for F/U after ER visit       Review of Systems   Gastrointestinal:  Positive for nausea.   All other systems reviewed and are negative.      Objective   /72 (BP Location: Right arm, Patient Position: Sitting, BP Cuff Size: Adult)   Pulse 89   Temp 36.6 °C (97.9 °F) (Temporal)   Ht 1.753 m (5' 9\")   Wt 68 kg (150 lb)   SpO2 98%   BMI 22.15 kg/m²     Physical Exam  Vitals reviewed.   Constitutional:       Appearance: Normal appearance.   Neurological:      Mental Status: He is alert.   Psychiatric:         Mood and Affect: Mood normal.         Behavior: Behavior normal.       Assessment/Plan   Problem List Items Addressed This Visit             ICD-10-CM    Chronic obstructive pulmonary disease (Multi) J44.9    Type 2 diabetes mellitus without complication (Multi) - Primary E11.9    Relevant Medications    empagliflozin (Jardiance) 25 mg    metFORMIN  mg 24 hr tablet    blood-glucose sensor (FreeStyle Jarred 3 Sensor) device    Other Relevant Orders    POCT glycosylated hemoglobin (Hb A1C) manually resulted    Follow Up In Clinical Pharmacy     Other Visit Diagnoses         Codes    Chronic pancreatitis, unspecified pancreatitis type (Multi)     K86.1    Nausea and vomiting, unspecified vomiting type     R11.2    Relevant Medications    ondansetron ODT (Zofran-ODT) 8 mg disintegrating tablet               "

## 2024-08-29 NOTE — PROGRESS NOTES
Has not been taking medications; thought there was a lapse in insurance so did not follow up  Significant nausea with Mounjaro and Trulicity  Moderate GI upset with metformin but unsure what doses were taken  Does not want to use insulin - works as [employer] and hypoglycemia a big concern with being on ladders/ elevated surfaces  Agreeable to start Jardiance and low dose metformin  Aware close follow up is necessary and that elevated glucose are very damaging, insulin likely next step  Interested in using CGM again, phone is compatible with Jarred 3 talat (needs password at home to download)  - will call to set up apt if needing for set up     Reviewed metformin and Jardiance MOA, side effects/precautions, dosing and administration    Jardiance Education:     - Counseled patient on Jardiance MOA, expectations, side effects, duration of therapy, administration, and monitoring parameters.  - Reviewed the benefits of SGLT-2i therapy, such as glycemic control and kidney and CV protection.  - Advised patient to practice proper  hygiene to reduce risk of UTIs or yeast infections.  - Advised patient to maintain adequate fluid intake to remain hydrated while on SGLT2i therapy.  - Answered all patient questions and concerns.    Medication education provided by CINDY Romano, PharmD, BCPS

## 2024-08-31 ENCOUNTER — APPOINTMENT (OUTPATIENT)
Dept: RADIOLOGY | Facility: HOSPITAL | Age: 54
End: 2024-08-31
Payer: COMMERCIAL

## 2024-08-31 ENCOUNTER — HOSPITAL ENCOUNTER (EMERGENCY)
Facility: HOSPITAL | Age: 54
Discharge: HOME | End: 2024-08-31
Payer: COMMERCIAL

## 2024-08-31 VITALS
WEIGHT: 145 LBS | DIASTOLIC BLOOD PRESSURE: 81 MMHG | TEMPERATURE: 97.5 F | SYSTOLIC BLOOD PRESSURE: 111 MMHG | BODY MASS INDEX: 21.48 KG/M2 | OXYGEN SATURATION: 99 % | HEART RATE: 78 BPM | HEIGHT: 69 IN | RESPIRATION RATE: 18 BRPM

## 2024-08-31 DIAGNOSIS — M79.18 MUSCULOSKELETAL PAIN: ICD-10-CM
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
## 2024-08-31 DIAGNOSIS — S70.01XA CONTUSION OF RIGHT HIP, INITIAL ENCOUNTER: ICD-10-CM

## 2024-08-31 DIAGNOSIS — S53.401A ELBOW SPRAIN, RIGHT, INITIAL ENCOUNTER: ICD-10-CM

## 2024-08-31 DIAGNOSIS — S43.401A SPRAIN OF RIGHT SHOULDER, UNSPECIFIED SHOULDER SPRAIN TYPE, INITIAL ENCOUNTER: ICD-10-CM

## 2024-08-31 PROCEDURE — 73060 X-RAY EXAM OF HUMERUS: CPT | Mod: RT

## 2024-08-31 PROCEDURE — 2500000001 HC RX 250 WO HCPCS SELF ADMINISTERED DRUGS (ALT 637 FOR MEDICARE OP): Performed by: PHYSICIAN ASSISTANT

## 2024-08-31 PROCEDURE — 72125 CT NECK SPINE W/O DYE: CPT

## 2024-08-31 PROCEDURE — 73502 X-RAY EXAM HIP UNI 2-3 VIEWS: CPT | Mod: RIGHT SIDE | Performed by: RADIOLOGY

## 2024-08-31 PROCEDURE — 99284 EMERGENCY DEPT VISIT MOD MDM: CPT

## 2024-08-31 PROCEDURE — 72125 CT NECK SPINE W/O DYE: CPT | Performed by: RADIOLOGY

## 2024-08-31 PROCEDURE — 73060 X-RAY EXAM OF HUMERUS: CPT | Mod: RIGHT SIDE | Performed by: RADIOLOGY

## 2024-08-31 PROCEDURE — 73502 X-RAY EXAM HIP UNI 2-3 VIEWS: CPT | Mod: RT

## 2024-08-31 RX ORDER — IBUPROFEN 600 MG/1
600 TABLET ORAL EVERY 6 HOURS PRN
Qty: 20 TABLET | Refills: 0 | Status: SHIPPED | OUTPATIENT
Start: 2024-08-31 | End: 2024-09-07

## 2024-08-31 RX ORDER — METHOCARBAMOL 500 MG/1
1000 TABLET, FILM COATED ORAL ONCE
Status: COMPLETED | OUTPATIENT
Start: 2024-08-31 | End: 2024-08-31

## 2024-08-31 RX ORDER — LIDOCAINE 50 MG/G
1 PATCH TOPICAL DAILY PRN
Qty: 10 PATCH | Refills: 0 | Status: SHIPPED | OUTPATIENT
Start: 2024-08-31 | End: 2024-09-10

## 2024-08-31 RX ORDER — IBUPROFEN 600 MG/1
600 TABLET ORAL ONCE
Status: COMPLETED | OUTPATIENT
Start: 2024-08-31 | End: 2024-08-31

## 2024-08-31 RX ORDER — METHOCARBAMOL 500 MG/1
1000 TABLET, FILM COATED ORAL 2 TIMES DAILY PRN
Qty: 20 TABLET | Refills: 0 | Status: SHIPPED | OUTPATIENT
Start: 2024-08-31 | End: 2024-09-10

## 2024-08-31 RX ADMIN — METHOCARBAMOL 1000 MG: 500 TABLET ORAL at 14:23

## 2024-08-31 RX ADMIN — IBUPROFEN 600 MG: 600 TABLET, FILM COATED ORAL at 13:00

## 2024-08-31 ASSESSMENT — LIFESTYLE VARIABLES
HAVE YOU EVER FELT YOU SHOULD CUT DOWN ON YOUR DRINKING: NO
HAVE PEOPLE ANNOYED YOU BY CRITICIZING YOUR DRINKING: NO
TOTAL SCORE: 0
EVER HAD A DRINK FIRST THING IN THE MORNING TO STEADY YOUR NERVES TO GET RID OF A HANGOVER: NO
EVER FELT BAD OR GUILTY ABOUT YOUR DRINKING: NO

## 2024-08-31 ASSESSMENT — PAIN - FUNCTIONAL ASSESSMENT
PAIN_FUNCTIONAL_ASSESSMENT: 0-10
PAIN_FUNCTIONAL_ASSESSMENT: 0-10

## 2024-08-31 ASSESSMENT — PAIN SCALES - GENERAL
PAINLEVEL_OUTOF10: 0 - NO PAIN
PAINLEVEL_OUTOF10: 6

## 2024-08-31 ASSESSMENT — PAIN DESCRIPTION - PAIN TYPE: TYPE: ACUTE PAIN

## 2024-08-31 NOTE — Clinical Note
Clifton Beverly was seen and treated in our emergency department on 8/31/2024.  He may return to work on 09/03/2024.  Patient may return to activities earlier if feeling improved.     If you have any questions or concerns, please don't hesitate to call.      Robert Vásquez PA-C

## 2024-08-31 NOTE — ED PROVIDER NOTES
HPI   Chief Complaint   Patient presents with    Fall     Fell last night on a wet floor, c.o right hip, right shoulder and right elbow.       53-year-old male seen in the ED for evaluation of a fall that occurred last night.  Was at [employer] and slipped on the wet floor landing on his right elbow and right hip injuring it.  He thinks he actually injured his neck as well from a described whiplash type event.  States having neck pain, right elbow pain as well as right lateral shoulder pain pointing to his humerus and right hip pain and pain with walking.  Denies numbness or tingling or weakness of extremities, inability to ambulate or anticoagulant use, loss of consciousness.              Patient History   Past Medical History:   Diagnosis Date    Diabetes mellitus (Multi)      No past surgical history on file.  Family History   Problem Relation Name Age of Onset    Cancer Mother      Diabetes Father      Cancer Father       Social History     Tobacco Use    Smoking status: Every Day     Current packs/day: 1.00     Types: Cigarettes    Smokeless tobacco: Never   Vaping Use    Vaping status: Never Used   Substance Use Topics    Alcohol use: Never    Drug use: Yes     Types: Marijuana       Physical Exam   ED Triage Vitals [08/31/24 1214]   Temperature Heart Rate Respirations BP   36.4 °C (97.5 °F) 78 18 --      Pulse Ox Temp Source Heart Rate Source Patient Position   96 % Temporal -- --      BP Location FiO2 (%)     -- --       Physical Exam  Constitutional:       Comments: GENERAL APPEARANCE: Patient is in no acute distress.  HEENT: Normocephalic. atraumatic.  NECK: Trachea midline.   CARDIOVASCULAR: Heart is regular rate and rhythm and without murmur.   RESPIRATORY: Lungs clear to auscultation bilaterally.  No increased respiratory effort.  No acute respiratory distress.  MUSCULOSKELETAL: Moves extremities. No injury or obvious deformity.  There are some tenderness of the bilateral paraspinous muscles  of the neck and some slight midline tenderness near the mid cervical spine.  Right elbow with tenderness to palpation and passive and active range of motion causing discomfort.  Radial pulse plus intact.  No neurovascular horas compartment syndrome distally to the right lower extremity.  Right shoulder with generalized tenderness as well as to the AC joint region.  NEUROLOGIC:  Alert and oriented.  BEHAVIORAL:  Age appropriate and cooperative.  SKIN:  Clean and dry.           ED Course & MDM                  No data recorded                                 Medical Decision Making  53-year-old male seen in the ED for evaluation of a slip and fall that occurred last night injuring his right elbow, right shoulder, right hip and neck.  Due to consideration for emergent process including fracture, dislocation amongst others a work-up is pursued.  Medications  methocarbamol (Robaxin) tablet 1,000 mg (has no administration in time range)  ibuprofen tablet 600 mg (600 mg oral Given 8/31/24 1300)    Labs Reviewed - No data to display    XR humerus right   Final Result    No acute osseous abnormality.          Signed by: Duyen York 8/31/2024 2:00 PM    Dictation workstation:   [id number]     XR hip right with pelvis when performed 2 or 3 views   Final Result    No acute osseous abnormality.                MACRO:    None          Signed by: Duyen York 8/31/2024 2:01 PM    Dictation workstation:   [id number]     CT cervical spine wo IV contrast   Final Result    No evidence for an acute fracture or subluxation of the cervical    spine.          Degenerative change.          Dental disease.          Signed by: Duyen York 8/31/2024 2:02 PM    Dictation workstation:   [id number]       Imaging negative for emergent process.  Patient was treated symptomatically and instructed supportive care, close follow-up and return precautions.    This document was completed using voice recognition transcription software.   Please excuse any errors of transcription including grammatical, punctuation and spelling errors.  Please contact me with any questions regarding this chart.    Amount and/or Complexity of Data Reviewed  Radiology:  Decision-making details documented in ED Course.    Risk  OTC drugs.  Prescription drug management.        Procedure  Procedures     Robert Vásquez PA-C  08/31/24 1410

## 2024-08-31 NOTE — DISCHARGE INSTRUCTIONS
Thank you for allowing me to take care of you at [facility].  I hope you are feeling better.  Please return to the ED if you have any new or worsening symptoms.  Otherwise follow-up with your primary doctor.  Use rest and ice intermittently for 20 minutes at a time, multiple times a day for pain.  Robert Vásquez PA-C

## 2024-10-03 ENCOUNTER — OFFICE VISIT (OUTPATIENT)
Dept: PRIMARY CARE | Facility: CLINIC | Age: 54
End: 2024-10-03
Payer: COMMERCIAL

## 2024-10-03 VITALS
DIASTOLIC BLOOD PRESSURE: 72 MMHG | WEIGHT: 154.8 LBS | TEMPERATURE: 96.4 F | OXYGEN SATURATION: 95 % | HEIGHT: 69 IN | HEART RATE: 92 BPM | RESPIRATION RATE: 18 BRPM | BODY MASS INDEX: 22.93 KG/M2 | SYSTOLIC BLOOD PRESSURE: 119 MMHG

## 2024-10-03 DIAGNOSIS — M54.12 CERVICAL RADICULITIS: ICD-10-CM

## 2024-10-03 DIAGNOSIS — J44.9 CHRONIC OBSTRUCTIVE PULMONARY DISEASE, UNSPECIFIED COPD TYPE (MULTI): ICD-10-CM

## 2024-10-03 DIAGNOSIS — N52.9 ERECTILE DYSFUNCTION, UNSPECIFIED ERECTILE DYSFUNCTION TYPE: ICD-10-CM

## 2024-10-03 DIAGNOSIS — F41.9 ANXIETY: Primary | ICD-10-CM

## 2024-10-03 DIAGNOSIS — M50.90 CERVICAL DISC DISEASE: ICD-10-CM

## 2024-10-03 PROCEDURE — 3008F BODY MASS INDEX DOCD: CPT | Performed by: FAMILY MEDICINE

## 2024-10-03 PROCEDURE — 4004F PT TOBACCO SCREEN RCVD TLK: CPT | Performed by: FAMILY MEDICINE

## 2024-10-03 PROCEDURE — 3078F DIAST BP <80 MM HG: CPT | Performed by: FAMILY MEDICINE

## 2024-10-03 PROCEDURE — 99214 OFFICE O/P EST MOD 30 MIN: CPT | Performed by: FAMILY MEDICINE

## 2024-10-03 PROCEDURE — 3074F SYST BP LT 130 MM HG: CPT | Performed by: FAMILY MEDICINE

## 2024-10-03 RX ORDER — OXYCODONE AND ACETAMINOPHEN 5; 325 MG/1; MG/1
1 TABLET ORAL EVERY 6 HOURS PRN
Qty: 30 TABLET | Refills: 0 | Status: SHIPPED | OUTPATIENT
Start: 2024-10-03

## 2024-10-03 RX ORDER — ALPRAZOLAM 0.5 MG/1
0.5 TABLET ORAL NIGHTLY PRN
Qty: 30 TABLET | Refills: 0 | Status: SHIPPED | OUTPATIENT
Start: 2024-10-03 | End: 2024-11-02

## 2024-10-03 RX ORDER — TADALAFIL 20 MG/1
20 TABLET ORAL DAILY
Qty: 10 TABLET | Refills: 11 | Status: SHIPPED | OUTPATIENT
Start: 2024-10-03

## 2024-10-03 RX ORDER — FLUTICASONE FUROATE, UMECLIDINIUM BROMIDE AND VILANTEROL TRIFENATATE 200; 62.5; 25 UG/1; UG/1; UG/1
1 POWDER RESPIRATORY (INHALATION) DAILY
Qty: 1 EACH | Refills: 3 | Status: SHIPPED | OUTPATIENT
Start: 2024-10-03 | End: 2024-11-02

## 2024-10-03 RX ORDER — ALBUTEROL SULFATE 90 UG/1
2 INHALANT RESPIRATORY (INHALATION) EVERY 4 HOURS PRN
Qty: 18 G | Refills: 5 | Status: SHIPPED | OUTPATIENT
Start: 2024-10-03 | End: 2025-10-03

## 2024-10-03 ASSESSMENT — PATIENT HEALTH QUESTIONNAIRE - PHQ9
1. LITTLE INTEREST OR PLEASURE IN DOING THINGS: NOT AT ALL
SUM OF ALL RESPONSES TO PHQ9 QUESTIONS 1 AND 2: 0
2. FEELING DOWN, DEPRESSED OR HOPELESS: NOT AT ALL

## 2024-10-03 ASSESSMENT — PAIN SCALES - GENERAL: PAINLEVEL: 7

## 2024-10-03 ASSESSMENT — ENCOUNTER SYMPTOMS
NECK PAIN: 1
COUGH: 1
OCCASIONAL FEELINGS OF UNSTEADINESS: 0
LOSS OF SENSATION IN FEET: 0
DEPRESSION: 0

## 2024-10-03 NOTE — PROGRESS NOTES
"Subjective   Patient ID: Clifton Beverly Jr. is a 54 y.o. male who presents for Neck Pain and Cough.    HPI pt c/o cough with production. Pt c/o metformin causing stomach issues     Review of Systems    Objective   /72   Pulse 92   Temp 35.8 °C (96.4 °F)   Resp 18   Ht 1.753 m (5' 9\")   Wt 70.2 kg (154 lb 12.8 oz)   SpO2 95%   BMI 22.86 kg/m²     Physical Exam    Assessment/Plan          "

## 2024-10-03 NOTE — PROGRESS NOTES
"Subjective   Patient ID: Clifton Beverly Jr. is a 54 y.o. male who presents for Neck Pain, Cough, and Pancreatitis.    Neck Pain     Cough     cough for 1 year.      Review of Systems   Respiratory:  Positive for cough.    Musculoskeletal:  Positive for neck pain.       Objective   /72   Pulse 92   Temp 35.8 °C (96.4 °F)   Resp 18   Ht 1.753 m (5' 9\")   Wt 70.2 kg (154 lb 12.8 oz)   SpO2 95%   BMI 22.86 kg/m²     Physical Exam  Constitutional:       General: He is not in acute distress.     Appearance: Normal appearance.   Cardiovascular:      Rate and Rhythm: Normal rate and regular rhythm.      Heart sounds: No murmur heard.  Pulmonary:      Breath sounds: Normal breath sounds. No wheezing.   Neurological:      Mental Status: He is alert.         Assessment/Plan   Problem List Items Addressed This Visit             ICD-10-CM    Anxiety - Primary F41.9    Chronic obstructive pulmonary disease (Multi) J44.9    Relevant Medications    fluticasone-umeclidin-vilanter (Trelegy Ellipta) 200-62.5-25 mcg blister with device    albuterol (Ventolin HFA) 90 mcg/actuation inhaler    Cervical radiculitis M54.12          "

## 2024-10-31 DIAGNOSIS — F41.9 ANXIETY: ICD-10-CM

## 2024-10-31 RX ORDER — ALPRAZOLAM 0.5 MG/1
0.5 TABLET ORAL NIGHTLY PRN
Qty: 30 TABLET | Refills: 0 | Status: SHIPPED | OUTPATIENT
Start: 2024-11-02

## 2024-12-03 ENCOUNTER — OFFICE VISIT (OUTPATIENT)
Dept: PRIMARY CARE | Facility: CLINIC | Age: 54
End: 2024-12-03
Payer: COMMERCIAL

## 2024-12-03 VITALS
DIASTOLIC BLOOD PRESSURE: 78 MMHG | HEART RATE: 74 BPM | RESPIRATION RATE: 20 BRPM | TEMPERATURE: 98.1 F | SYSTOLIC BLOOD PRESSURE: 122 MMHG | OXYGEN SATURATION: 98 % | BODY MASS INDEX: 22.74 KG/M2 | WEIGHT: 154 LBS

## 2024-12-03 DIAGNOSIS — N52.9 ERECTILE DYSFUNCTION, UNSPECIFIED ERECTILE DYSFUNCTION TYPE: ICD-10-CM

## 2024-12-03 DIAGNOSIS — R11.10 CHRONIC VOMITING: Primary | ICD-10-CM

## 2024-12-03 DIAGNOSIS — B95.8 STAPH INFECTION: ICD-10-CM

## 2024-12-03 DIAGNOSIS — E11.9 TYPE 2 DIABETES MELLITUS WITHOUT COMPLICATION, WITHOUT LONG-TERM CURRENT USE OF INSULIN (MULTI): ICD-10-CM

## 2024-12-03 DIAGNOSIS — J44.9 CHRONIC OBSTRUCTIVE PULMONARY DISEASE, UNSPECIFIED COPD TYPE (MULTI): ICD-10-CM

## 2024-12-03 DIAGNOSIS — F41.9 ANXIETY: ICD-10-CM

## 2024-12-03 DIAGNOSIS — M50.90 CERVICAL DISC DISEASE: ICD-10-CM

## 2024-12-03 LAB — POC HEMOGLOBIN A1C: 9.4 % (ref 4.2–6.5)

## 2024-12-03 PROCEDURE — 3078F DIAST BP <80 MM HG: CPT | Performed by: FAMILY MEDICINE

## 2024-12-03 PROCEDURE — 99214 OFFICE O/P EST MOD 30 MIN: CPT | Performed by: FAMILY MEDICINE

## 2024-12-03 PROCEDURE — 83036 HEMOGLOBIN GLYCOSYLATED A1C: CPT | Performed by: FAMILY MEDICINE

## 2024-12-03 PROCEDURE — 4004F PT TOBACCO SCREEN RCVD TLK: CPT | Performed by: FAMILY MEDICINE

## 2024-12-03 PROCEDURE — 3074F SYST BP LT 130 MM HG: CPT | Performed by: FAMILY MEDICINE

## 2024-12-03 RX ORDER — ALBUTEROL SULFATE 90 UG/1
2 INHALANT RESPIRATORY (INHALATION) EVERY 4 HOURS PRN
Qty: 18 G | Refills: 5 | Status: SHIPPED | OUTPATIENT
Start: 2024-12-03 | End: 2025-12-03

## 2024-12-03 RX ORDER — TADALAFIL 20 MG/1
20 TABLET ORAL DAILY
Qty: 10 TABLET | Refills: 11 | Status: SHIPPED | OUTPATIENT
Start: 2024-12-03

## 2024-12-03 RX ORDER — ALPRAZOLAM 0.5 MG/1
0.5 TABLET ORAL NIGHTLY PRN
Qty: 30 TABLET | Refills: 0 | Status: SHIPPED | OUTPATIENT
Start: 2024-12-03

## 2024-12-03 RX ORDER — METFORMIN HYDROCHLORIDE 500 MG/1
500 TABLET, EXTENDED RELEASE ORAL
Qty: 90 TABLET | Refills: 3 | Status: SHIPPED | OUTPATIENT
Start: 2024-12-03 | End: 2025-12-03

## 2024-12-03 RX ORDER — ONDANSETRON 8 MG/1
8 TABLET, ORALLY DISINTEGRATING ORAL EVERY 8 HOURS PRN
Qty: 30 TABLET | Refills: 3 | Status: SHIPPED | OUTPATIENT
Start: 2024-12-03 | End: 2024-12-10

## 2024-12-03 RX ORDER — SULFAMETHOXAZOLE AND TRIMETHOPRIM 800; 160 MG/1; MG/1
1 TABLET ORAL 2 TIMES DAILY
Qty: 28 TABLET | Refills: 0 | Status: SHIPPED | OUTPATIENT
Start: 2024-12-03 | End: 2024-12-17

## 2024-12-03 RX ORDER — OXYCODONE AND ACETAMINOPHEN 5; 325 MG/1; MG/1
1 TABLET ORAL EVERY 6 HOURS PRN
Qty: 30 TABLET | Refills: 0 | Status: SHIPPED | OUTPATIENT
Start: 2024-12-03

## 2024-12-03 ASSESSMENT — ENCOUNTER SYMPTOMS
OCCASIONAL FEELINGS OF UNSTEADINESS: 0
LOSS OF SENSATION IN FEET: 0
DEPRESSION: 0

## 2024-12-03 ASSESSMENT — PAIN SCALES - GENERAL: PAINLEVEL_OUTOF10: 0-NO PAIN

## 2024-12-03 NOTE — PROGRESS NOTES
Subjective   Patient ID: Clifton Beverly Jr. is a 54 y.o. male who presents for Vomiting (Pt has had on and off episodes of nausea and vomiting for days and was told it was pancreatitis but he doesn't believe that is it).    HPI     Review of Systems    Objective   /78   Pulse 74   Temp 36.7 °C (98.1 °F)   Resp 20   Wt 69.9 kg (154 lb)   SpO2 98%   BMI 22.74 kg/m²     Physical Exam  Constitutional:       General: He is not in acute distress.     Appearance: Normal appearance.   Cardiovascular:      Rate and Rhythm: Normal rate and regular rhythm.      Heart sounds: No murmur heard.  Pulmonary:      Breath sounds: Normal breath sounds. No wheezing.   Neurological:      Mental Status: He is alert.       Assessment/Plan   Problem List Items Addressed This Visit             ICD-10-CM    Anxiety F41.9    Relevant Medications    ALPRAZolam (Xanax) 0.5 mg tablet    Cervical disc disease M50.90    Relevant Medications    oxyCODONE-acetaminophen (Percocet) 5-325 mg tablet    Chronic obstructive pulmonary disease (Multi) J44.9    Relevant Medications    albuterol (Ventolin HFA) 90 mcg/actuation inhaler    Erectile dysfunction N52.9    Relevant Medications    tadalafil 20 mg tablet    Type 2 diabetes mellitus without complication (Multi) E11.9    Relevant Medications    metFORMIN  mg 24 hr tablet    empagliflozin (Jardiance) 25 mg    Other Relevant Orders    POCT glycosylated hemoglobin (Hb A1C) manually resulted     Other Visit Diagnoses         Codes    Chronic vomiting    -  Primary R11.10    Relevant Medications    ondansetron ODT (Zofran-ODT) 8 mg disintegrating tablet    Other Relevant Orders    CBC and Auto Differential    Comprehensive Metabolic Panel    Amylase    Referral to Gastroenterology    Staph infection     B95.8    Relevant Medications    sulfamethoxazole-trimethoprim (Bactrim DS) 800-160 mg tablet

## 2024-12-05 RX ORDER — METFORMIN HYDROCHLORIDE 500 MG/1
TABLET, EXTENDED RELEASE ORAL
Qty: 90 TABLET | Refills: 0 | OUTPATIENT
Start: 2024-12-05

## 2024-12-05 RX ORDER — EMPAGLIFLOZIN 25 MG/1
25 TABLET, FILM COATED ORAL DAILY
Qty: 90 TABLET | Refills: 0 | OUTPATIENT
Start: 2024-12-05

## 2025-02-25 ENCOUNTER — OFFICE VISIT (OUTPATIENT)
Dept: PRIMARY CARE | Facility: CLINIC | Age: 55
End: 2025-02-25
Payer: COMMERCIAL

## 2025-02-25 VITALS
WEIGHT: 156.6 LBS | RESPIRATION RATE: 18 BRPM | SYSTOLIC BLOOD PRESSURE: 118 MMHG | DIASTOLIC BLOOD PRESSURE: 64 MMHG | OXYGEN SATURATION: 100 % | BODY MASS INDEX: 23.13 KG/M2 | HEART RATE: 79 BPM

## 2025-02-25 DIAGNOSIS — J44.9 CHRONIC OBSTRUCTIVE PULMONARY DISEASE, UNSPECIFIED COPD TYPE (MULTI): ICD-10-CM

## 2025-02-25 DIAGNOSIS — R11.10 CHRONIC VOMITING: Primary | ICD-10-CM

## 2025-02-25 DIAGNOSIS — M54.2 CERVICALGIA: ICD-10-CM

## 2025-02-25 DIAGNOSIS — F41.9 ANXIETY: ICD-10-CM

## 2025-02-25 DIAGNOSIS — E11.9 TYPE 2 DIABETES MELLITUS WITHOUT COMPLICATION, UNSPECIFIED WHETHER LONG TERM INSULIN USE (MULTI): ICD-10-CM

## 2025-02-25 PROCEDURE — 3074F SYST BP LT 130 MM HG: CPT | Performed by: FAMILY MEDICINE

## 2025-02-25 PROCEDURE — 99214 OFFICE O/P EST MOD 30 MIN: CPT | Performed by: FAMILY MEDICINE

## 2025-02-25 PROCEDURE — 3078F DIAST BP <80 MM HG: CPT | Performed by: FAMILY MEDICINE

## 2025-02-25 RX ORDER — ALPRAZOLAM 0.5 MG/1
0.5 TABLET ORAL NIGHTLY PRN
Qty: 30 TABLET | Refills: 0 | Status: SHIPPED | OUTPATIENT
Start: 2025-02-25

## 2025-02-25 RX ORDER — MELOXICAM 15 MG/1
15 TABLET ORAL DAILY
Qty: 30 TABLET | Refills: 11 | Status: SHIPPED | OUTPATIENT
Start: 2025-02-25 | End: 2026-02-25

## 2025-02-25 RX ORDER — ESCITALOPRAM OXALATE 10 MG/1
10 TABLET ORAL DAILY
Qty: 30 TABLET | Refills: 5 | Status: SHIPPED | OUTPATIENT
Start: 2025-02-25 | End: 2025-08-24

## 2025-02-25 ASSESSMENT — PATIENT HEALTH QUESTIONNAIRE - PHQ9
SUM OF ALL RESPONSES TO PHQ9 QUESTIONS 1 AND 2: 0
1. LITTLE INTEREST OR PLEASURE IN DOING THINGS: NOT AT ALL
2. FEELING DOWN, DEPRESSED OR HOPELESS: NOT AT ALL

## 2025-02-25 ASSESSMENT — ENCOUNTER SYMPTOMS
DEPRESSION: 0
OCCASIONAL FEELINGS OF UNSTEADINESS: 0
LOSS OF SENSATION IN FEET: 0

## 2025-02-25 ASSESSMENT — PAIN SCALES - GENERAL: PAINLEVEL_OUTOF10: 0-NO PAIN

## 2025-02-25 NOTE — PROGRESS NOTES
Subjective   Patient ID: Clifton Beverly Jr. is a 54 y.o. male who presents for Follow-up (Pt said that he had the flu previously and was having breathing issues and inhaler is not working. Pt also stopped the metformin due to gi issues. He needs something to replace the Zoloft he was on because it is effecting his moods.).    HPI     Review of Systems    Objective   /64   Pulse 79   Resp 18   Wt 71 kg (156 lb 9.6 oz)   SpO2 100%   BMI 23.13 kg/m²     Physical Exam  Constitutional:       General: He is not in acute distress.     Appearance: Normal appearance.   Cardiovascular:      Rate and Rhythm: Normal rate and regular rhythm.      Heart sounds: No murmur heard.  Pulmonary:      Breath sounds: Normal breath sounds. No wheezing.   Neurological:      Mental Status: He is alert.         Assessment/Plan   Problem List Items Addressed This Visit             ICD-10-CM    Anxiety F41.9    Relevant Medications    escitalopram (Lexapro) 10 mg tablet    ALPRAZolam (Xanax) 0.5 mg tablet    Chronic obstructive pulmonary disease (Multi) J44.9    Relevant Orders    Referral to Pulmonology    Type 2 diabetes mellitus without complication (Multi) E11.9    Relevant Medications    SITagliptin phosphate (Januvia) 100 mg tablet    Cervicalgia M54.2    Relevant Medications    meloxicam (Mobic) 15 mg tablet     Other Visit Diagnoses         Codes    Chronic vomiting    -  Primary R11.10    Relevant Orders    Referral to Gastroenterology

## 2025-03-30 DIAGNOSIS — F41.9 ANXIETY: ICD-10-CM

## 2025-03-30 DIAGNOSIS — J44.9 CHRONIC OBSTRUCTIVE PULMONARY DISEASE, UNSPECIFIED COPD TYPE (MULTI): ICD-10-CM

## 2025-03-31 RX ORDER — ALPRAZOLAM 0.5 MG/1
TABLET ORAL
Qty: 30 TABLET | Refills: 0 | Status: SHIPPED | OUTPATIENT
Start: 2025-03-31

## 2025-03-31 RX ORDER — SERTRALINE HYDROCHLORIDE 100 MG/1
100 TABLET, FILM COATED ORAL DAILY
Qty: 90 TABLET | Refills: 0 | Status: SHIPPED | OUTPATIENT
Start: 2025-03-31

## 2025-03-31 RX ORDER — FLUTICASONE FUROATE, UMECLIDINIUM BROMIDE AND VILANTEROL TRIFENATATE 200; 62.5; 25 UG/1; UG/1; UG/1
1 POWDER RESPIRATORY (INHALATION) DAILY
Qty: 1 EACH | Refills: 3 | Status: SHIPPED | OUTPATIENT
Start: 2025-03-31

## 2025-05-07 DIAGNOSIS — J44.9 CHRONIC OBSTRUCTIVE PULMONARY DISEASE, UNSPECIFIED COPD TYPE (MULTI): ICD-10-CM

## 2025-05-07 DIAGNOSIS — F41.9 ANXIETY: ICD-10-CM

## 2025-05-08 RX ORDER — ALPRAZOLAM 0.5 MG/1
TABLET ORAL
Qty: 30 TABLET | Refills: 0 | Status: SHIPPED | OUTPATIENT
Start: 2025-05-08

## 2025-05-08 RX ORDER — ALBUTEROL SULFATE 90 UG/1
2 INHALANT RESPIRATORY (INHALATION) EVERY 4 HOURS PRN
Qty: 18 G | Refills: 0 | Status: SHIPPED | OUTPATIENT
Start: 2025-05-08 | End: 2026-05-08

## 2025-06-16 ENCOUNTER — OFFICE VISIT (OUTPATIENT)
Dept: PRIMARY CARE | Facility: CLINIC | Age: 55
End: 2025-06-16
Payer: COMMERCIAL

## 2025-06-16 VITALS
WEIGHT: 153 LBS | BODY MASS INDEX: 22.66 KG/M2 | OXYGEN SATURATION: 97 % | HEART RATE: 115 BPM | TEMPERATURE: 97.7 F | RESPIRATION RATE: 18 BRPM | HEIGHT: 69 IN | DIASTOLIC BLOOD PRESSURE: 42 MMHG | SYSTOLIC BLOOD PRESSURE: 82 MMHG

## 2025-06-16 DIAGNOSIS — M19.042 ARTHRITIS OF BOTH HANDS: ICD-10-CM

## 2025-06-16 DIAGNOSIS — M19.041 ARTHRITIS OF BOTH HANDS: ICD-10-CM

## 2025-06-16 DIAGNOSIS — F41.9 ANXIETY: ICD-10-CM

## 2025-06-16 DIAGNOSIS — R11.10 CHRONIC VOMITING: Primary | ICD-10-CM

## 2025-06-16 DIAGNOSIS — R25.2 MUSCLE CRAMPS: ICD-10-CM

## 2025-06-16 DIAGNOSIS — M50.90 CERVICAL DISC DISEASE: ICD-10-CM

## 2025-06-16 PROBLEM — R20.2 PARESTHESIAS: Status: ACTIVE | Noted: 2025-06-16

## 2025-06-16 PROCEDURE — 3074F SYST BP LT 130 MM HG: CPT | Performed by: FAMILY MEDICINE

## 2025-06-16 PROCEDURE — 4004F PT TOBACCO SCREEN RCVD TLK: CPT | Performed by: FAMILY MEDICINE

## 2025-06-16 PROCEDURE — 99214 OFFICE O/P EST MOD 30 MIN: CPT | Performed by: FAMILY MEDICINE

## 2025-06-16 PROCEDURE — 3078F DIAST BP <80 MM HG: CPT | Performed by: FAMILY MEDICINE

## 2025-06-16 PROCEDURE — 3008F BODY MASS INDEX DOCD: CPT | Performed by: FAMILY MEDICINE

## 2025-06-16 RX ORDER — ONDANSETRON 8 MG/1
TABLET, ORALLY DISINTEGRATING ORAL
COMMUNITY
Start: 2025-05-07

## 2025-06-16 RX ORDER — ESCITALOPRAM OXALATE 20 MG/1
20 TABLET ORAL DAILY
Qty: 30 TABLET | Refills: 5 | Status: SHIPPED | OUTPATIENT
Start: 2025-06-16 | End: 2025-12-13

## 2025-06-16 RX ORDER — OXYCODONE AND ACETAMINOPHEN 5; 325 MG/1; MG/1
1 TABLET ORAL EVERY 6 HOURS PRN
Qty: 30 TABLET | Refills: 0 | Status: SHIPPED | OUTPATIENT
Start: 2025-06-16

## 2025-06-16 ASSESSMENT — PAIN SCALES - GENERAL: PAINLEVEL_OUTOF10: 7

## 2025-06-16 NOTE — PROGRESS NOTES
"Subjective   Patient ID: Clifton Beverly Jr. is a 54 y.o. male who presents for Vomiting and Neck Pain.    HPI pt c/o ongoing vomiting he does have pancreatitis     Review of Systems    Objective   BP (!) 82/42   Pulse (!) 115   Temp 36.5 °C (97.7 °F)   Resp 18   Ht 1.753 m (5' 9\")   Wt 69.4 kg (153 lb)   SpO2 97%   BMI 22.59 kg/m²     Physical Exam  Constitutional:       General: He is not in acute distress.     Appearance: Normal appearance.   Cardiovascular:      Rate and Rhythm: Normal rate and regular rhythm.      Heart sounds: No murmur heard.  Pulmonary:      Breath sounds: Normal breath sounds. No wheezing.   Neurological:      Mental Status: He is alert.         Assessment/Plan   Problem List Items Addressed This Visit           ICD-10-CM    Anxiety F41.9    Relevant Medications    escitalopram (Lexapro) 20 mg tablet    Cervical disc disease M50.90    Relevant Medications    oxyCODONE-acetaminophen (Percocet) 5-325 mg tablet     Other Visit Diagnoses         Codes      Chronic vomiting    -  Primary R11.10    Relevant Orders    Referral to Gastroenterology    CBC and Auto Differential    Comprehensive Metabolic Panel      Arthritis of both hands     M19.041, M19.042      Muscle cramps     R25.2    Relevant Orders    CBC and Auto Differential    Comprehensive Metabolic Panel        Voltaren gel       " Cellcept Counseling:  I discussed with the patient the risks of mycophenolate mofetil including but not limited to infection/immunosuppression, GI upset, hypokalemia, hypercholesterolemia, bone marrow suppression, lymphoproliferative disorders, malignancy, GI ulceration/bleed/perforation, colitis, interstitial lung disease, kidney failure, progressive multifocal leukoencephalopathy, and birth defects.  The patient understands that monitoring is required including a baseline creatinine and regular CBC testing. In addition, patient must alert us immediately if symptoms of infection or other concerning signs are noted.

## 2025-06-17 LAB
ALBUMIN SERPL-MCNC: 4.4 G/DL (ref 3.6–5.1)
ALP SERPL-CCNC: 66 U/L (ref 35–144)
ALT SERPL-CCNC: 16 U/L (ref 9–46)
ANION GAP SERPL CALCULATED.4IONS-SCNC: 10 MMOL/L (CALC) (ref 7–17)
AST SERPL-CCNC: 18 U/L (ref 10–35)
BASOPHILS # BLD AUTO: 58 CELLS/UL (ref 0–200)
BASOPHILS NFR BLD AUTO: 0.7 %
BILIRUB SERPL-MCNC: 0.5 MG/DL (ref 0.2–1.2)
BUN SERPL-MCNC: 5 MG/DL (ref 7–25)
CALCIUM SERPL-MCNC: 9.5 MG/DL (ref 8.6–10.3)
CHLORIDE SERPL-SCNC: 100 MMOL/L (ref 98–110)
CO2 SERPL-SCNC: 26 MMOL/L (ref 20–32)
CREAT SERPL-MCNC: 0.73 MG/DL (ref 0.7–1.3)
EGFRCR SERPLBLD CKD-EPI 2021: 107 ML/MIN/1.73M2
EOSINOPHIL # BLD AUTO: 100 CELLS/UL (ref 15–500)
EOSINOPHIL NFR BLD AUTO: 1.2 %
ERYTHROCYTE [DISTWIDTH] IN BLOOD BY AUTOMATED COUNT: 13.2 % (ref 11–15)
GLUCOSE SERPL-MCNC: 251 MG/DL (ref 65–99)
HCT VFR BLD AUTO: 45.7 % (ref 38.5–50)
HGB BLD-MCNC: 14.6 G/DL (ref 13.2–17.1)
LYMPHOCYTES # BLD AUTO: 2291 CELLS/UL (ref 850–3900)
LYMPHOCYTES NFR BLD AUTO: 27.6 %
MCH RBC QN AUTO: 29.3 PG (ref 27–33)
MCHC RBC AUTO-ENTMCNC: 31.9 G/DL (ref 32–36)
MCV RBC AUTO: 91.8 FL (ref 80–100)
MONOCYTES # BLD AUTO: 540 CELLS/UL (ref 200–950)
MONOCYTES NFR BLD AUTO: 6.5 %
NEUTROPHILS # BLD AUTO: 5312 CELLS/UL (ref 1500–7800)
NEUTROPHILS NFR BLD AUTO: 64 %
PLATELET # BLD AUTO: 362 THOUSAND/UL (ref 140–400)
PMV BLD REES-ECKER: 10.5 FL (ref 7.5–12.5)
POTASSIUM SERPL-SCNC: 4.3 MMOL/L (ref 3.5–5.3)
PROT SERPL-MCNC: 6.8 G/DL (ref 6.1–8.1)
RBC # BLD AUTO: 4.98 MILLION/UL (ref 4.2–5.8)
SODIUM SERPL-SCNC: 136 MMOL/L (ref 135–146)
WBC # BLD AUTO: 8.3 THOUSAND/UL (ref 3.8–10.8)

## 2025-07-06 ENCOUNTER — HOSPITAL ENCOUNTER (EMERGENCY)
Facility: HOSPITAL | Age: 55
Discharge: HOME | End: 2025-07-06
Payer: COMMERCIAL

## 2025-07-06 ENCOUNTER — APPOINTMENT (OUTPATIENT)
Dept: RADIOLOGY | Facility: HOSPITAL | Age: 55
End: 2025-07-06
Payer: COMMERCIAL

## 2025-07-06 VITALS
WEIGHT: 146.39 LBS | TEMPERATURE: 98.4 F | HEIGHT: 69 IN | HEART RATE: 77 BPM | OXYGEN SATURATION: 98 % | RESPIRATION RATE: 16 BRPM | DIASTOLIC BLOOD PRESSURE: 89 MMHG | SYSTOLIC BLOOD PRESSURE: 143 MMHG | BODY MASS INDEX: 21.68 KG/M2

## 2025-07-06 DIAGNOSIS — K86.1 OTHER CHRONIC PANCREATITIS: Primary | ICD-10-CM

## 2025-07-06 LAB
ALBUMIN SERPL BCP-MCNC: 4.1 G/DL (ref 3.4–5)
ALP SERPL-CCNC: 62 U/L (ref 33–120)
ALT SERPL W P-5'-P-CCNC: 13 U/L (ref 10–52)
AMPHETAMINES UR QL SCN: ABNORMAL
ANION GAP SERPL CALCULATED.3IONS-SCNC: 11 MMOL/L (ref 10–20)
APPEARANCE UR: ABNORMAL
AST SERPL W P-5'-P-CCNC: 9 U/L (ref 9–39)
BARBITURATES UR QL SCN: ABNORMAL
BASOPHILS # BLD AUTO: 0.08 X10*3/UL (ref 0–0.1)
BASOPHILS NFR BLD AUTO: 1 %
BENZODIAZ UR QL SCN: ABNORMAL
BILIRUB SERPL-MCNC: 0.4 MG/DL (ref 0–1.2)
BILIRUB UR STRIP.AUTO-MCNC: NEGATIVE MG/DL
BUN SERPL-MCNC: 10 MG/DL (ref 6–23)
BZE UR QL SCN: ABNORMAL
CALCIUM SERPL-MCNC: 9 MG/DL (ref 8.6–10.3)
CANNABINOIDS UR QL SCN: ABNORMAL
CHLORIDE SERPL-SCNC: 99 MMOL/L (ref 98–107)
CO2 SERPL-SCNC: 30 MMOL/L (ref 21–32)
COLOR UR: ABNORMAL
CREAT SERPL-MCNC: 0.76 MG/DL (ref 0.5–1.3)
EGFRCR SERPLBLD CKD-EPI 2021: >90 ML/MIN/1.73M*2
EOSINOPHIL # BLD AUTO: 0.18 X10*3/UL (ref 0–0.7)
EOSINOPHIL NFR BLD AUTO: 2.2 %
ERYTHROCYTE [DISTWIDTH] IN BLOOD BY AUTOMATED COUNT: 13.6 % (ref 11.5–14.5)
ETHANOL SERPL-MCNC: <10 MG/DL
FENTANYL+NORFENTANYL UR QL SCN: ABNORMAL
GLUCOSE SERPL-MCNC: 302 MG/DL (ref 74–99)
GLUCOSE UR STRIP.AUTO-MCNC: ABNORMAL MG/DL
HCT VFR BLD AUTO: 45.1 % (ref 41–52)
HGB BLD-MCNC: 15 G/DL (ref 13.5–17.5)
IMM GRANULOCYTES # BLD AUTO: 0.03 X10*3/UL (ref 0–0.7)
IMM GRANULOCYTES NFR BLD AUTO: 0.4 % (ref 0–0.9)
KETONES UR STRIP.AUTO-MCNC: NEGATIVE MG/DL
LEUKOCYTE ESTERASE UR QL STRIP.AUTO: NEGATIVE
LIPASE SERPL-CCNC: 30 U/L (ref 9–82)
LYMPHOCYTES # BLD AUTO: 1.67 X10*3/UL (ref 1.2–4.8)
LYMPHOCYTES NFR BLD AUTO: 20.8 %
MAGNESIUM SERPL-MCNC: 1.95 MG/DL (ref 1.6–2.4)
MCH RBC QN AUTO: 29.8 PG (ref 26–34)
MCHC RBC AUTO-ENTMCNC: 33.3 G/DL (ref 32–36)
MCV RBC AUTO: 90 FL (ref 80–100)
METHADONE UR QL SCN: ABNORMAL
MONOCYTES # BLD AUTO: 0.46 X10*3/UL (ref 0.1–1)
MONOCYTES NFR BLD AUTO: 5.7 %
NEUTROPHILS # BLD AUTO: 5.61 X10*3/UL (ref 1.2–7.7)
NEUTROPHILS NFR BLD AUTO: 69.9 %
NITRITE UR QL STRIP.AUTO: NEGATIVE
NRBC BLD-RTO: 0 /100 WBCS (ref 0–0)
OPIATES UR QL SCN: ABNORMAL
OXYCODONE+OXYMORPHONE UR QL SCN: ABNORMAL
PCP UR QL SCN: ABNORMAL
PH UR STRIP.AUTO: 7 [PH]
PLATELET # BLD AUTO: 353 X10*3/UL (ref 150–450)
POTASSIUM SERPL-SCNC: 4.4 MMOL/L (ref 3.5–5.3)
PROT SERPL-MCNC: 6.5 G/DL (ref 6.4–8.2)
PROT UR STRIP.AUTO-MCNC: NEGATIVE MG/DL
RBC # BLD AUTO: 5.03 X10*6/UL (ref 4.5–5.9)
RBC # UR STRIP.AUTO: NEGATIVE MG/DL
SODIUM SERPL-SCNC: 136 MMOL/L (ref 136–145)
SP GR UR STRIP.AUTO: 1.01
UROBILINOGEN UR STRIP.AUTO-MCNC: NORMAL MG/DL
WBC # BLD AUTO: 8 X10*3/UL (ref 4.4–11.3)

## 2025-07-06 PROCEDURE — 96361 HYDRATE IV INFUSION ADD-ON: CPT

## 2025-07-06 PROCEDURE — 83690 ASSAY OF LIPASE: CPT

## 2025-07-06 PROCEDURE — 80053 COMPREHEN METABOLIC PANEL: CPT

## 2025-07-06 PROCEDURE — 85025 COMPLETE CBC W/AUTO DIFF WBC: CPT

## 2025-07-06 PROCEDURE — 74177 CT ABD & PELVIS W/CONTRAST: CPT | Performed by: RADIOLOGY

## 2025-07-06 PROCEDURE — 81003 URINALYSIS AUTO W/O SCOPE: CPT

## 2025-07-06 PROCEDURE — 96374 THER/PROPH/DIAG INJ IV PUSH: CPT | Mod: 59

## 2025-07-06 PROCEDURE — 82077 ASSAY SPEC XCP UR&BREATH IA: CPT

## 2025-07-06 PROCEDURE — 83735 ASSAY OF MAGNESIUM: CPT

## 2025-07-06 PROCEDURE — 99285 EMERGENCY DEPT VISIT HI MDM: CPT | Mod: 25

## 2025-07-06 PROCEDURE — 80307 DRUG TEST PRSMV CHEM ANLYZR: CPT

## 2025-07-06 PROCEDURE — 36415 COLL VENOUS BLD VENIPUNCTURE: CPT

## 2025-07-06 PROCEDURE — 74177 CT ABD & PELVIS W/CONTRAST: CPT

## 2025-07-06 PROCEDURE — 2500000004 HC RX 250 GENERAL PHARMACY W/ HCPCS (ALT 636 FOR OP/ED)

## 2025-07-06 PROCEDURE — 2550000001 HC RX 255 CONTRASTS

## 2025-07-06 RX ORDER — PROCHLORPERAZINE MALEATE 10 MG
10 TABLET ORAL 2 TIMES DAILY PRN
Qty: 20 TABLET | Refills: 0 | Status: SHIPPED | OUTPATIENT
Start: 2025-07-06 | End: 2025-07-16

## 2025-07-06 RX ORDER — PROCHLORPERAZINE EDISYLATE 5 MG/ML
10 INJECTION INTRAMUSCULAR; INTRAVENOUS ONCE
Status: COMPLETED | OUTPATIENT
Start: 2025-07-06 | End: 2025-07-06

## 2025-07-06 RX ADMIN — PROCHLORPERAZINE EDISYLATE 10 MG: 5 INJECTION INTRAMUSCULAR; INTRAVENOUS at 11:43

## 2025-07-06 RX ADMIN — IOHEXOL 75 ML: 350 INJECTION, SOLUTION INTRAVENOUS at 12:29

## 2025-07-06 RX ADMIN — SODIUM CHLORIDE, SODIUM LACTATE, POTASSIUM CHLORIDE, AND CALCIUM CHLORIDE 1000 ML: 600; 310; 30; 20 INJECTION, SOLUTION INTRAVENOUS at 11:40

## 2025-07-06 ASSESSMENT — PAIN SCALES - GENERAL: PAINLEVEL_OUTOF10: 0 - NO PAIN

## 2025-07-06 ASSESSMENT — PAIN - FUNCTIONAL ASSESSMENT: PAIN_FUNCTIONAL_ASSESSMENT: 0-10

## 2025-07-06 NOTE — Clinical Note
Clifton Beverly was seen and treated in our emergency department on 7/6/2025.  He may return to work on 07/08/2025.       If you have any questions or concerns, please don't hesitate to call.      Taras Felix PA-C

## 2025-07-06 NOTE — ED PROVIDER NOTES
HPI   Chief Complaint   Patient presents with    Vomiting     Pt complains of n/v that has been going on for years.  States that it is worse today.  Has an appointment with GI, but he cannot wait.  Hx of pancreatitis.       Patient is a 54-year-old male presenting with intermittent nausea and vomiting.  Going on for the last several years.  States that he has not been able to identify what the primary causes.  He has not taken out his water tank.  He has tried different foods.  It seems to be a 24-hour bug that happens almost daily.  States that he has followed up with GI and while he was in the process of getting things identified, he lost his insurance and was not able to follow-up.  He is supposed to see a GI specialist but a scheduled appointment has not been made quite yet.  He states that he can no longer take it.  He has lost significant weight due to this nausea and vomiting.  Also endorses he has been having intermittent diarrhea but also has regular bowel movements.  No abdominal surgeries.  Does endorse cannabis use and states that even when he had discontinued cannabis he was still having nausea and vomiting.  Patient denies fevers, chills, cough, sore throat, runny nose, chest pain, shortness of breath, or urinary complaints.              Patient History   Medical History[1]  Surgical History[2]  Family History[3]  Social History[4]    Physical Exam   ED Triage Vitals [07/06/25 1114]   Temperature Heart Rate Respirations BP   36.9 °C (98.4 °F) 77 16 143/89      Pulse Ox Temp src Heart Rate Source Patient Position   98 % -- -- --      BP Location FiO2 (%)     -- --       Physical Exam  Vitals and nursing note reviewed.   Constitutional:       Appearance: He is well-developed.      Comments: Awake, laying in examination bed   HENT:      Head: Normocephalic and atraumatic.      Nose: Nose normal.      Mouth/Throat:      Mouth: Mucous membranes are moist.      Pharynx: Oropharynx is clear.   Eyes:       Extraocular Movements: Extraocular movements intact.      Conjunctiva/sclera: Conjunctivae normal.      Pupils: Pupils are equal, round, and reactive to light.   Cardiovascular:      Rate and Rhythm: Normal rate and regular rhythm.      Pulses: Normal pulses.      Heart sounds: Normal heart sounds. No murmur heard.  Pulmonary:      Effort: Pulmonary effort is normal. No respiratory distress.      Breath sounds: Normal breath sounds.   Abdominal:      General: Abdomen is flat.      Palpations: Abdomen is soft.      Tenderness: There is abdominal tenderness.      Comments: Generalized abdominal tenderness to palpation   Musculoskeletal:         General: No swelling. Normal range of motion.      Cervical back: Normal range of motion and neck supple.   Skin:     General: Skin is warm and dry.      Capillary Refill: Capillary refill takes less than 2 seconds.   Neurological:      General: No focal deficit present.      Mental Status: He is alert and oriented to person, place, and time.   Psychiatric:         Mood and Affect: Mood normal.         Behavior: Behavior normal.           ED Course & MDM   Diagnoses as of 07/07/25 2107   Other chronic pancreatitis                 No data recorded     Fraser Coma Scale Score: 15 (07/06/25 1115 : Linn Wayne RN)                           Medical Decision Making  Patient is a 54-year-old male presenting with intermittent nausea and vomiting.  Lab work, urine imaging ordered.  Medication ordered.  Conditions considered include but are not limited to: Intra-abdominal pathology, viral illness.    Attending physician was available for consultation with patient.  Patient did ask if we would be able to absolutely identify the cause of his symptoms today.  I discussed with him that that would likely not be the case.  We will do our due diligence to find acute pathology or emergent pathology and if it requires admission then to do as such.  If it is not requiring admission, will have  the patient follow-up outpatient with respective fields.    CBC is without leukocytosis or anemia.  CMP without significant electrolyte abnormality or renal impairment.  Urine drug screen positive for cannabis use.  UA with micro is without infection.  CT scan does show chronic pancreatitis.  Lipase 30.  Magnesium within normal.  EtOH is negative.    Patient is very frustrated with the fact that we do not have a definitive treatment plan besides antinausea meds and to follow-up with GI.  I did discuss that this appears to be more of a chronic issue and he needs to follow-up with GI for possible additional imaging and testing.  Patient did not even wait for repeat vitals before frustratingly leaving the emergency department.    I believe this patient is at low risk for complication, and a disposition of discharge is acceptable.  Return to the Emergency Department if new or worsening symptoms including headache, fever, chills, chest pain, shortness of breath, syncope, near syncope, abdominal pain, nausea, vomiting,  diarrhea, or worsening pain.  GI referral given.  Discussed with patient that he should stop by the  to talk to registration about getting appointment scheduled.  Uncertain if he did this as he did leave in [facility].  Prescription for Compazine had been written.    Portions of this note made with Dragon software, please be mindful of potential grammatical errors.      Medications   lactated Ringer's bolus 1,000 mL (0 mL intravenous Stopped 7/6/25 1344)   prochlorperazine (Compazine) injection 10 mg (10 mg intravenous Given 7/6/25 1143)   iohexol (OMNIPaque) 350 mg iodine/mL solution 75 mL (75 mL intravenous Given 7/6/25 1229)     Labs Reviewed   COMPREHENSIVE METABOLIC PANEL - Abnormal       Result Value    Glucose 302 (*)     Sodium 136      Potassium 4.4      Chloride 99      Bicarbonate 30      Anion Gap 11      Urea Nitrogen 10      Creatinine 0.76      eGFR >90      Calcium 9.0      Albumin 4.1       Alkaline Phosphatase 62      Total Protein 6.5      AST 9      Bilirubin, Total 0.4      ALT 13     URINALYSIS WITH REFLEX CULTURE AND MICROSCOPIC - Abnormal    Color, Urine Light-Yellow      Appearance, Urine Ex.Turbid (*)     Specific Gravity, Urine 1.015      pH, Urine 7.0      Protein, Urine NEGATIVE      Glucose, Urine OVER (4+) (*)     Blood, Urine NEGATIVE      Ketones, Urine NEGATIVE      Bilirubin, Urine NEGATIVE      Urobilinogen, Urine Normal      Nitrite, Urine NEGATIVE      Leukocyte Esterase, Urine NEGATIVE      Narrative:     OVER is reported when the result is greater than the clinically reportable range.   DRUG SCREEN,URINE - Abnormal    Amphetamine Screen, Urine Presumptive Negative      Barbiturate Screen, Urine Presumptive Negative      Benzodiazepines Screen, Urine Presumptive Negative      Cannabinoid Screen, Urine Presumptive Positive (*)     Cocaine Metabolite Screen, Urine Presumptive Negative      Fentanyl Screen, Urine Presumptive Negative      Opiate Screen, Urine Presumptive Negative      Oxycodone Screen, Urine Presumptive Negative      PCP Screen, Urine Presumptive Negative      Methadone Screen, Urine Presumptive Negative      Narrative:     Drug screen results are presumptive and should not be used to assess   compliance with prescribed medication. Contact the performing [facility] laboratory   to add-on definitive confirmatory testing if clinically indicated.    Toxicology screening results are reported qualitatively. The concentration must   be greater than or equal to the cutoff to be reported as positive. The concentration   at which the screening test can detect an individual drug or metabolite varies.   The absence of expected drug(s) and/or drug metabolite(s) may indicate non-compliance,   inappropriate timing of specimen collection relative to drug administration, poor drug   absorption, diluted/adulterated urine, or limitations of testing. For medical purposes   only; not  valid for forensic use.    Interpretive questions should be directed to the laboratory medical directors.   MAGNESIUM - Normal    Magnesium 1.95     LIPASE - Normal    Lipase 30      Narrative:     Venipuncture immediately after or during the administration of Metamizole may lead to falsely low results. Testing should be performed immediately prior to Metamizole dosing.   ALCOHOL - Normal    Alcohol <10     CBC WITH AUTO DIFFERENTIAL    WBC 8.0      nRBC 0.0      RBC 5.03      Hemoglobin 15.0      Hematocrit 45.1      MCV 90      MCH 29.8      MCHC 33.3      RDW 13.6      Platelets 353      Neutrophils % 69.9      Immature Granulocytes %, Automated 0.4      Lymphocytes % 20.8      Monocytes % 5.7      Eosinophils % 2.2      Basophils % 1.0      Neutrophils Absolute 5.61      Immature Granulocytes Absolute, Automated 0.03      Lymphocytes Absolute 1.67      Monocytes Absolute 0.46      Eosinophils Absolute 0.18      Basophils Absolute 0.08     URINALYSIS WITH REFLEX CULTURE AND MICROSCOPIC    Narrative:     The following orders were created for panel order Urinalysis with Reflex Culture and Microscopic.  Procedure                               Abnormality         Status                     ---------                               -----------         ------                     Urinalysis with Reflex C...[[id number]]  Abnormal            Final result               Extra Urine Gray Tube[[id number]]                                                         Please view results for these tests on the individual orders.   EXTRA URINE GRAY TUBE     CT abdomen pelvis w IV contrast   Final Result   1. No acute abdominal or pelvic pathology.   2. Constipation.   3. Chronic pancreatitis.   4. Bilateral benign simple renal cysts. Nonobstructing left   intrarenal calculi.        MACRO:   None        Signed by: Nelida Oneal 7/6/2025 1:03 PM   Dictation workstation:   [id number]            Procedure  Procedures       [1]   Past  Medical History:  Diagnosis Date    Diabetes mellitus (Multi)    [2] History reviewed. No pertinent surgical history.  [3]   Family History  Problem Relation Name Age of Onset    Cancer Mother      Diabetes Father      Cancer Father     [4]   Social History  Tobacco Use    Smoking status: Every Day     Current packs/day: 1.00     Types: Cigarettes    Smokeless tobacco: Never   Vaping Use    Vaping status: Never Used   Substance Use Topics    Alcohol use: Never    Drug use: Yes     Types: Marijuana        Taras Felix PA-C  07/07/25 8980

## 2025-07-06 NOTE — DISCHARGE INSTRUCTIONS
Follow-up with GI.  Use Compazine.  Increase oral hydration.    Be sure to take all medications, over the counter medications or prescription medications only as directed.    Be sure to follow up as directed in 1-2 days. All of the details of your follow up instructions are detailed in the follow up section of this packet.    If you are being discharged with any pains medications or muscle relaxers (norco, Vicodin, hydrocodone products, Percocet, oxycodone products, flexeril, cyclobenzaprine, robaxin, norflex, brand or generic, or any other pain controlling medications with the exception of Ibuprofen and regular Tylenol, do not drive or operate machinery, climb ladders or participate in any activity that could potentially put yourself or others at risk should you get dizzy, or be/feel impaired at all.    Return to emergency room without delay for ANY new or worsening pains or for any other symptoms or concerns. Return with worsening pains, nausea, vomiting, trouble breathing, palpitations, shortness of breath, inability to pass stool or urine, loss of control of stool or urine, any numbness or tingling (that is not normal for you), uncontrolled fevers, the passing of blood or other material in stool or urine, rashes, pains or for any other symptoms or concerns you may have. You are always welcome to return to the ER at any time for any reason or for any other concerns you may have.

## 2025-07-07 ENCOUNTER — TELEPHONE (OUTPATIENT)
Dept: PRIMARY CARE | Facility: CLINIC | Age: 55
End: 2025-07-07
Payer: COMMERCIAL

## 2025-07-08 ENCOUNTER — OFFICE VISIT (OUTPATIENT)
Facility: CLINIC | Age: 55
End: 2025-07-08
Payer: COMMERCIAL

## 2025-07-08 VITALS — BODY MASS INDEX: 21.62 KG/M2 | HEART RATE: 77 BPM | WEIGHT: 146 LBS | HEIGHT: 69 IN

## 2025-07-08 DIAGNOSIS — M50.90 CERVICAL DISC DISEASE: ICD-10-CM

## 2025-07-08 DIAGNOSIS — K86.1 CHRONIC PANCREATITIS, UNSPECIFIED PANCREATITIS TYPE (MULTI): ICD-10-CM

## 2025-07-08 DIAGNOSIS — E11.69 TYPE 2 DIABETES MELLITUS WITH OTHER SPECIFIED COMPLICATION, WITHOUT LONG-TERM CURRENT USE OF INSULIN: ICD-10-CM

## 2025-07-08 DIAGNOSIS — J44.9 CHRONIC OBSTRUCTIVE PULMONARY DISEASE, UNSPECIFIED COPD TYPE (MULTI): ICD-10-CM

## 2025-07-08 DIAGNOSIS — R11.2 NAUSEA AND VOMITING, UNSPECIFIED VOMITING TYPE: Primary | ICD-10-CM

## 2025-07-08 DIAGNOSIS — F12.90 MARIJUANA USE: ICD-10-CM

## 2025-07-08 PROCEDURE — 99205 OFFICE O/P NEW HI 60 MIN: CPT | Performed by: INTERNAL MEDICINE

## 2025-07-08 PROCEDURE — 3008F BODY MASS INDEX DOCD: CPT | Performed by: INTERNAL MEDICINE

## 2025-07-08 PROCEDURE — 4004F PT TOBACCO SCREEN RCVD TLK: CPT | Performed by: INTERNAL MEDICINE

## 2025-07-08 RX ORDER — OMEPRAZOLE 40 MG/1
40 CAPSULE, DELAYED RELEASE ORAL DAILY
Qty: 90 CAPSULE | Refills: 1 | Status: SHIPPED | OUTPATIENT
Start: 2025-07-08 | End: 2026-07-08

## 2025-07-08 RX ORDER — OXYCODONE AND ACETAMINOPHEN 5; 325 MG/1; MG/1
1 TABLET ORAL EVERY 8 HOURS PRN
Qty: 20 TABLET | Refills: 0 | Status: SHIPPED | OUTPATIENT
Start: 2025-07-08

## 2025-07-08 RX ORDER — ALBUTEROL SULFATE 90 UG/1
INHALANT RESPIRATORY (INHALATION)
Qty: 18 G | Refills: 0 | Status: SHIPPED | OUTPATIENT
Start: 2025-07-08

## 2025-07-08 NOTE — PROGRESS NOTES
Primary Care Provider: Jesús Miller DO  Referring Provider: No ref. provider found  My final recommendations will be communicated back to the referring physician and/or primary care provider via shared medical records or via US mail.    Chief Complaint (Reason for visit):   Clifton Beverly Jr. is a 54 y.o. male who presents for nausea and emesis    ASSESSMENT AND PLAN     Assessment & Plan  Nausea and vomiting, unspecified vomiting type  Multifactorial etiology for the patient's nausea and vomi including but not limited to  A) smoking cigarettes and marijuana  B) high blood sugars and possible gastroparesis  C) chronic pancreatitis changes  D) PUD, gastritis, H.pylori infection    ED records, labs and CT scan-reviewed and summarized as below    - I advised the patient to stop smoking cigarettes and marijuana  - Patient states that he has not drank alcohol in over the 30 years  I will prescribe him empiric PPIs  - GES  - EGD. Pt prefers to do it near his home.  - Also advised him on gastroparesis diet    Orders:    Esophagogastroduodenoscopy (EGD); Future    NM gastric emptying solid; Future    Esophagogastroduodenoscopy (EGD); Future    omeprazole (PriLOSEC) 40 mg DR capsule; Take 1 capsule (40 mg) by mouth once daily. Take one pill in the morning on empty stomach, 30-45 minutes before eating. Do not crush or chew.    Marijuana use    Orders:    omeprazole (PriLOSEC) 40 mg DR capsule; Take 1 capsule (40 mg) by mouth once daily. Take one pill in the morning on empty stomach, 30-45 minutes before eating. Do not crush or chew.    Type 2 diabetes mellitus with other specified complication, without long-term current use of insulin    Orders:    NM gastric emptying solid; Future    Esophagogastroduodenoscopy (EGD); Future    omeprazole (PriLOSEC) 40 mg DR capsule; Take 1 capsule (40 mg) by mouth once daily. Take one pill in the morning on empty stomach, 30-45 minutes before eating. Do not crush or chew.    Chronic  "pancreatitis, unspecified pancreatitis type (Multi)           I discussed the risks, benefits and alternative(s) of the procedure(s) with the patient. Pt verbalizes understanding and is willing to proceed. All questions were answered.    Nicholas Murrieta MD, MS    SUBJECTIVE   HPI: History obtained from patient  54-year-old male comes to see me for nausea and vomiting    Patient states that he has days where he vomits 12-15 times a day and cannot keep anything down.     These episodes used to happen 1 day every few months, then used to happen 1 day every few weeks, and now happen multiple times in a week    He has been to ER  I have reviewed the records    Labs-largely reassuring  CT scan-chronic pancreatitis changes, otherwise reassuring    Patient's labs show HbA1c in the 9-12 range  He endorses smoking cigarettes and marijuana every day          Medical History[1]    Surgical History[2]    Current Medications[3]    Allergies[4]  OBJECTIVE   PHYSICAL EXAM:  Pulse 77   Ht 1.753 m (5' 9\")   Wt 66.2 kg (146 lb)   BMI 21.56 kg/m²      LABS/IMAGING/SCOPES  Lab Results   Component Value Date    WBC 8.0 07/06/2025    HGB 15.0 07/06/2025    HCT 45.1 07/06/2025    MCV 90 07/06/2025     07/06/2025     Lab Results   Component Value Date    GLUCOSE 302 (H) 07/06/2025    CALCIUM 9.0 07/06/2025     07/06/2025    K 4.4 07/06/2025    CO2 30 07/06/2025    CL 99 07/06/2025    BUN 10 07/06/2025    CREATININE 0.76 07/06/2025     Lab Results   Component Value Date    ALT 13 07/06/2025    AST 9 07/06/2025    ALKPHOS 62 07/06/2025    BILITOT 0.4 07/06/2025       === 07/06/25 ===    CT ABDOMEN PELVIS W IV CONTRAST    - Impression -  1. No acute abdominal or pelvic pathology.  2. Constipation.  3. Chronic pancreatitis.  4. Bilateral benign simple renal cysts. Nonobstructing left  intrarenal calculi.    MACRO:  None    Signed by: Nelida Oneal 7/6/2025 1:03 PM  Dictation workstation:   [id number]    Nicholas Murrieta MD, " "MS         [1]   Past Medical History:  Diagnosis Date    Diabetes mellitus (Multi)    [2] No past surgical history on file.  [3]   Current Outpatient Medications   Medication Sig Dispense Refill    albuterol 90 mcg/actuation inhaler inhale 2 puffs every 4 hours if needed for wheezing or shortness of breath 18 g 0    ALPRAZolam (Xanax) 0.5 mg tablet TAKE ONE TABLET BY MOUTH AT BEDTIME IF NEEDED FOR ANXIETY. 30 tablet 0    blood sugar diagnostic strip Use to test blood sugar 3 times per day .      blood-glucose sensor (FreeStyle Jarred 3 Sensor) device Use to monitor glucose as directed, change sensor every 14 days 2 each 11    empagliflozin (Jardiance) 25 mg Take 1 tablet (25 mg) by mouth once daily. 90 tablet 1    escitalopram (Lexapro) 10 mg tablet Take 1 tablet (10 mg) by mouth once daily. 30 tablet 5    escitalopram (Lexapro) 20 mg tablet Take 1 tablet (20 mg) by mouth once daily. 30 tablet 5    fluticasone-umeclidin-vilanter (Trelegy Ellipta) 200-62.5-25 mcg blister with device inhale 1 puff by mouth once daily 1 each 3    meloxicam (Mobic) 15 mg tablet Take 1 tablet (15 mg) by mouth once daily. 30 tablet 11    metFORMIN  mg 24 hr tablet Take 1 tablet (500 mg) by mouth once daily in the evening. Take with meals. Do not crush, chew, or split. 90 tablet 3    oxyCODONE-acetaminophen (Percocet) 5-325 mg tablet Take 1 tablet by mouth every 6 hours if needed for moderate pain (4 - 6). 30 tablet 0    pen needle, diabetic 32 gauge x 5/32\" needle use 1 pen needle per day to take insulin      prochlorperazine (Compazine) 10 mg tablet Take 1 tablet (10 mg) by mouth 2 times a day as needed for nausea or vomiting for up to 10 days. 20 tablet 0    sertraline (Zoloft) 100 mg tablet TAKE ONE TABLET BY MOUTH EVERY DAY 90 tablet 0    SITagliptin phosphate (Januvia) 100 mg tablet Take 1 tablet (100 mg) by mouth once daily. 30 tablet 11    tadalafil 20 mg tablet Take 1 tablet (20 mg) by mouth once daily. 10 tablet 11    " clonazePAM (KlonoPIN) 0.5 mg tablet Take 1 tablet (0.5 mg) by mouth once daily at bedtime. 30 tablet 0    methocarbamol (Robaxin) 500 mg tablet Take 2 tablets (1,000 mg) by mouth 2 times a day as needed for muscle spasms (muscle spasm) for up to 10 days. 20 tablet 0    ondansetron ODT (Zofran-ODT) 8 mg disintegrating tablet DISSOLVE 1 TABLET IN THE MOUTH EVERY 8 HOURS IF NEEDED FOR NAUSEA OR VOMITING FOR UP TO 7 DAYS. (Patient not taking: Reported on 7/8/2025)       No current facility-administered medications for this visit.   [4]   Allergies  Allergen Reactions    Penicillins Unknown    Morphine Rash

## 2025-07-08 NOTE — LETTER
July 8, 2025     Jesús Miller DO  [address]  [address]  [facility] [address]    Patient: Clifton Beverly Jr.   YOB: 1970   Date of Visit: 7/8/2025       Dear Dr. Jesús Miller DO:    Thank you for referring Clifton Beverly to me for evaluation. Below are my notes for this consultation.  If you have questions, please do not hesitate to call me. I look forward to following your patient along with you.       Sincerely,     Nicholas Murrieta MD, MS      CC: No Recipients  ______________________________________________________________________________________    Primary Care Provider: Jesús Miller DO  Referring Provider: No ref. provider found  My final recommendations will be communicated back to the referring physician and/or primary care provider via shared medical records or via US mail.    Chief Complaint (Reason for visit):   Clifton Beverly Jr. is a 54 y.o. male who presents for nausea and emesis    ASSESSMENT AND PLAN     Assessment & Plan  Nausea and vomiting, unspecified vomiting type  Multifactorial etiology for the patient's nausea and vomi including but not limited to  A) smoking cigarettes and marijuana  B) high blood sugars and possible gastroparesis  C) chronic pancreatitis changes  D) PUD, gastritis, H.pylori infection    ED records, labs and CT scan-reviewed and summarized as below    - I advised the patient to stop smoking cigarettes and marijuana  - Patient states that he has not drank alcohol in over the 30 years  I will prescribe him empiric PPIs  - GES  - EGD. Pt prefers to do it near his home.  - Also advised him on gastroparesis diet    Orders:  •  Esophagogastroduodenoscopy (EGD); Future  •  NM gastric emptying solid; Future  •  Esophagogastroduodenoscopy (EGD); Future  •  omeprazole (PriLOSEC) 40 mg DR capsule; Take 1 capsule (40 mg) by mouth once daily. Take one pill in the morning on empty stomach, 30-45 minutes before eating. Do not crush or  "chew.    Marijuana use    Orders:  •  omeprazole (PriLOSEC) 40 mg DR capsule; Take 1 capsule (40 mg) by mouth once daily. Take one pill in the morning on empty stomach, 30-45 minutes before eating. Do not crush or chew.    Type 2 diabetes mellitus with other specified complication, without long-term current use of insulin    Orders:  •  NM gastric emptying solid; Future  •  Esophagogastroduodenoscopy (EGD); Future  •  omeprazole (PriLOSEC) 40 mg DR capsule; Take 1 capsule (40 mg) by mouth once daily. Take one pill in the morning on empty stomach, 30-45 minutes before eating. Do not crush or chew.    Chronic pancreatitis, unspecified pancreatitis type (Multi)           I discussed the risks, benefits and alternative(s) of the procedure(s) with the patient. Pt verbalizes understanding and is willing to proceed. All questions were answered.    Nicholas Murrieta MD, MS    SUBJECTIVE   HPI: History obtained from patient  54-year-old male comes to see me for nausea and vomiting    Patient states that he has days where he vomits 12-15 times a day and cannot keep anything down.     These episodes used to happen 1 day every few months, then used to happen 1 day every few weeks, and now happen multiple times in a week    He has been to ER  I have reviewed the records    Labs-largely reassuring  CT scan-chronic pancreatitis changes, otherwise reassuring    Patient's labs show HbA1c in the 9-12 range  He endorses smoking cigarettes and marijuana every day          Medical History[1]    Surgical History[2]    Current Medications[3]    Allergies[4]  OBJECTIVE   PHYSICAL EXAM:  Pulse 77   Ht 1.753 m (5' 9\")   Wt 66.2 kg (146 lb)   BMI 21.56 kg/m²      LABS/IMAGING/SCOPES  Lab Results   Component Value Date    WBC 8.0 07/06/2025    HGB 15.0 07/06/2025    HCT 45.1 07/06/2025    MCV 90 07/06/2025     07/06/2025     Lab Results   Component Value Date    GLUCOSE 302 (H) 07/06/2025    CALCIUM 9.0 07/06/2025     07/06/2025 "    K 4.4 07/06/2025    CO2 30 07/06/2025    CL 99 07/06/2025    BUN 10 07/06/2025    CREATININE 0.76 07/06/2025     Lab Results   Component Value Date    ALT 13 07/06/2025    AST 9 07/06/2025    ALKPHOS 62 07/06/2025    BILITOT 0.4 07/06/2025       === 07/06/25 ===    CT ABDOMEN PELVIS W IV CONTRAST    - Impression -  1. No acute abdominal or pelvic pathology.  2. Constipation.  3. Chronic pancreatitis.  4. Bilateral benign simple renal cysts. Nonobstructing left  intrarenal calculi.    MACRO:  None    Signed by: Nelida Oneal 7/6/2025 1:03 PM  Dictation workstation:   [id number]    Nicholas Murrieta MD, MS           [1]  Past Medical History:  Diagnosis Date   • Diabetes mellitus (Multi)    [2]  No past surgical history on file.  [3]  Current Outpatient Medications   Medication Sig Dispense Refill   • albuterol 90 mcg/actuation inhaler inhale 2 puffs every 4 hours if needed for wheezing or shortness of breath 18 g 0   • ALPRAZolam (Xanax) 0.5 mg tablet TAKE ONE TABLET BY MOUTH AT BEDTIME IF NEEDED FOR ANXIETY. 30 tablet 0   • blood sugar diagnostic strip Use to test blood sugar 3 times per day .     • blood-glucose sensor (FreeStyle Jarred 3 Sensor) device Use to monitor glucose as directed, change sensor every 14 days 2 each 11   • empagliflozin (Jardiance) 25 mg Take 1 tablet (25 mg) by mouth once daily. 90 tablet 1   • escitalopram (Lexapro) 10 mg tablet Take 1 tablet (10 mg) by mouth once daily. 30 tablet 5   • escitalopram (Lexapro) 20 mg tablet Take 1 tablet (20 mg) by mouth once daily. 30 tablet 5   • fluticasone-umeclidin-vilanter (Trelegy Ellipta) 200-62.5-25 mcg blister with device inhale 1 puff by mouth once daily 1 each 3   • meloxicam (Mobic) 15 mg tablet Take 1 tablet (15 mg) by mouth once daily. 30 tablet 11   • metFORMIN  mg 24 hr tablet Take 1 tablet (500 mg) by mouth once daily in the evening. Take with meals. Do not crush, chew, or split. 90 tablet 3   • oxyCODONE-acetaminophen (Percocet)  "5-325 mg tablet Take 1 tablet by mouth every 6 hours if needed for moderate pain (4 - 6). 30 tablet 0   • pen needle, diabetic 32 gauge x 5/32\" needle use 1 pen needle per day to take insulin     • prochlorperazine (Compazine) 10 mg tablet Take 1 tablet (10 mg) by mouth 2 times a day as needed for nausea or vomiting for up to 10 days. 20 tablet 0   • sertraline (Zoloft) 100 mg tablet TAKE ONE TABLET BY MOUTH EVERY DAY 90 tablet 0   • SITagliptin phosphate (Januvia) 100 mg tablet Take 1 tablet (100 mg) by mouth once daily. 30 tablet 11   • tadalafil 20 mg tablet Take 1 tablet (20 mg) by mouth once daily. 10 tablet 11   • clonazePAM (KlonoPIN) 0.5 mg tablet Take 1 tablet (0.5 mg) by mouth once daily at bedtime. 30 tablet 0   • methocarbamol (Robaxin) 500 mg tablet Take 2 tablets (1,000 mg) by mouth 2 times a day as needed for muscle spasms (muscle spasm) for up to 10 days. 20 tablet 0   • ondansetron ODT (Zofran-ODT) 8 mg disintegrating tablet DISSOLVE 1 TABLET IN THE MOUTH EVERY 8 HOURS IF NEEDED FOR NAUSEA OR VOMITING FOR UP TO 7 DAYS. (Patient not taking: Reported on 7/8/2025)       No current facility-administered medications for this visit.   [4]  Allergies  Allergen Reactions   • Penicillins Unknown   • Morphine Rash   "

## 2025-07-10 DIAGNOSIS — F41.9 ANXIETY: ICD-10-CM

## 2025-07-10 RX ORDER — ALPRAZOLAM 0.5 MG/1
0.5 TABLET ORAL NIGHTLY PRN
Qty: 30 TABLET | Refills: 0 | Status: SHIPPED | OUTPATIENT
Start: 2025-07-10 | End: 2025-08-09

## 2025-08-05 ENCOUNTER — OFFICE VISIT (OUTPATIENT)
Dept: PRIMARY CARE | Facility: CLINIC | Age: 55
End: 2025-08-05
Payer: COMMERCIAL

## 2025-08-05 VITALS
BODY MASS INDEX: 22.72 KG/M2 | HEIGHT: 69 IN | DIASTOLIC BLOOD PRESSURE: 62 MMHG | OXYGEN SATURATION: 93 % | WEIGHT: 153.4 LBS | SYSTOLIC BLOOD PRESSURE: 108 MMHG | HEART RATE: 87 BPM | TEMPERATURE: 97.7 F | RESPIRATION RATE: 18 BRPM

## 2025-08-05 DIAGNOSIS — F98.8 ATTENTION DEFICIT DISORDER, UNSPECIFIED TYPE: ICD-10-CM

## 2025-08-05 DIAGNOSIS — M54.12 CERVICAL RADICULITIS: Primary | ICD-10-CM

## 2025-08-05 DIAGNOSIS — J44.9 CHRONIC OBSTRUCTIVE PULMONARY DISEASE, UNSPECIFIED COPD TYPE (MULTI): ICD-10-CM

## 2025-08-05 DIAGNOSIS — E11.9 TYPE 2 DIABETES MELLITUS WITHOUT COMPLICATION, UNSPECIFIED WHETHER LONG TERM INSULIN USE: ICD-10-CM

## 2025-08-05 DIAGNOSIS — M50.90 CERVICAL DISC DISEASE: ICD-10-CM

## 2025-08-05 PROCEDURE — 99214 OFFICE O/P EST MOD 30 MIN: CPT | Performed by: FAMILY MEDICINE

## 2025-08-05 PROCEDURE — 3008F BODY MASS INDEX DOCD: CPT | Performed by: FAMILY MEDICINE

## 2025-08-05 PROCEDURE — 3078F DIAST BP <80 MM HG: CPT | Performed by: FAMILY MEDICINE

## 2025-08-05 PROCEDURE — 3074F SYST BP LT 130 MM HG: CPT | Performed by: FAMILY MEDICINE

## 2025-08-05 RX ORDER — DEXTROAMPHETAMINE SACCHARATE, AMPHETAMINE ASPARTATE, DEXTROAMPHETAMINE SULFATE AND AMPHETAMINE SULFATE 2.5; 2.5; 2.5; 2.5 MG/1; MG/1; MG/1; MG/1
10 TABLET ORAL 2 TIMES DAILY
Qty: 60 TABLET | Refills: 0 | Status: SHIPPED | OUTPATIENT
Start: 2025-08-05 | End: 2025-09-04

## 2025-08-05 RX ORDER — FLUTICASONE FUROATE, UMECLIDINIUM BROMIDE AND VILANTEROL TRIFENATATE 200; 62.5; 25 UG/1; UG/1; UG/1
1 POWDER RESPIRATORY (INHALATION) DAILY
Qty: 1 EACH | Refills: 3 | Status: SHIPPED | OUTPATIENT
Start: 2025-08-05

## 2025-08-05 RX ORDER — OXYCODONE AND ACETAMINOPHEN 5; 325 MG/1; MG/1
1 TABLET ORAL EVERY 8 HOURS PRN
Qty: 20 TABLET | Refills: 0 | Status: SHIPPED | OUTPATIENT
Start: 2025-08-05

## 2025-08-05 ASSESSMENT — PATIENT HEALTH QUESTIONNAIRE - PHQ9
1. LITTLE INTEREST OR PLEASURE IN DOING THINGS: NOT AT ALL
2. FEELING DOWN, DEPRESSED OR HOPELESS: NOT AT ALL
SUM OF ALL RESPONSES TO PHQ9 QUESTIONS 1 AND 2: 0

## 2025-08-05 ASSESSMENT — PAIN SCALES - GENERAL: PAINLEVEL_OUTOF10: 7

## 2025-08-05 NOTE — PROGRESS NOTES
"Subjective   Patient ID: Clifton Beverly Jr. is a 54 y.o. male who presents for Neck Pain.    HPI pt still c/o ongoing neck pain     Review of Systems    Objective   /62   Pulse 87   Temp 36.5 °C (97.7 °F)   Resp 18   Ht 1.753 m (5' 9\")   Wt 69.6 kg (153 lb 6.4 oz)   SpO2 93%   BMI 22.65 kg/m²     Physical Exam  Constitutional:       General: He is not in acute distress.     Appearance: Normal appearance.     Cardiovascular:      Rate and Rhythm: Normal rate and regular rhythm.      Heart sounds: No murmur heard.  Pulmonary:      Breath sounds: Normal breath sounds. No wheezing.     Neurological:      Mental Status: He is alert.         Assessment/Plan   Problem List Items Addressed This Visit           ICD-10-CM    Cervical disc disease M50.90    Relevant Medications    oxyCODONE-acetaminophen (Percocet) 5-325 mg tablet    Chronic obstructive pulmonary disease (Multi) J44.9    Relevant Medications    fluticasone-umeclidin-vilanter (Trelegy Ellipta) 200-62.5-25 mcg blister with device    Type 2 diabetes mellitus without complication E11.9    Cervical radiculitis - Primary M54.12     Other Visit Diagnoses         Codes      Attention deficit disorder, unspecified type     F98.8    Relevant Medications    amphetamine-dextroamphetamine (Adderall) 10 mg tablet               "

## 2025-08-08 DIAGNOSIS — F41.9 ANXIETY: ICD-10-CM

## 2025-08-08 RX ORDER — ALPRAZOLAM 0.5 MG/1
TABLET ORAL
Qty: 30 TABLET | Refills: 0 | Status: SHIPPED | OUTPATIENT
Start: 2025-08-08

## 2025-08-14 ENCOUNTER — APPOINTMENT (OUTPATIENT)
Dept: GASTROENTEROLOGY | Facility: EXTERNAL LOCATION | Age: 55
End: 2025-08-14
Payer: COMMERCIAL